# Patient Record
Sex: FEMALE | Race: WHITE | Employment: FULL TIME | URBAN - METROPOLITAN AREA
[De-identification: names, ages, dates, MRNs, and addresses within clinical notes are randomized per-mention and may not be internally consistent; named-entity substitution may affect disease eponyms.]

---

## 2017-07-22 ENCOUNTER — ANESTHESIA EVENT (OUTPATIENT)
Dept: SURGERY | Age: 37
DRG: 339 | End: 2017-07-22
Payer: COMMERCIAL

## 2017-07-22 ENCOUNTER — ANESTHESIA (OUTPATIENT)
Dept: SURGERY | Age: 37
DRG: 339 | End: 2017-07-22
Payer: COMMERCIAL

## 2017-07-22 ENCOUNTER — HOSPITAL ENCOUNTER (INPATIENT)
Age: 37
LOS: 3 days | Discharge: HOME | DRG: 339 | End: 2017-07-26
Attending: EMERGENCY MEDICINE | Admitting: FAMILY MEDICINE
Payer: COMMERCIAL

## 2017-07-22 ENCOUNTER — APPOINTMENT (OUTPATIENT)
Dept: CT IMAGING | Age: 37
DRG: 339 | End: 2017-07-22
Attending: EMERGENCY MEDICINE
Payer: COMMERCIAL

## 2017-07-22 DIAGNOSIS — K35.32 RUPTURED APPENDICITIS: Primary | ICD-10-CM

## 2017-07-22 PROBLEM — K35.80 ACUTE APPENDICITIS: Status: ACTIVE | Noted: 2017-07-22

## 2017-07-22 LAB
ALBUMIN SERPL BCP-MCNC: 3.6 G/DL (ref 3.5–5)
ALBUMIN/GLOB SERPL: 0.8 {RATIO} (ref 1.1–2.2)
ALP SERPL-CCNC: 124 U/L (ref 45–117)
ALT SERPL-CCNC: 71 U/L (ref 12–78)
ANION GAP BLD CALC-SCNC: 13 MMOL/L (ref 5–15)
AST SERPL W P-5'-P-CCNC: 36 U/L (ref 15–37)
BASOPHILS # BLD AUTO: 0 K/UL
BASOPHILS # BLD: 0 %
BILIRUB SERPL-MCNC: 1.8 MG/DL (ref 0.2–1)
BILIRUB UR QL CFM: NEGATIVE
BUN SERPL-MCNC: 10 MG/DL (ref 6–20)
BUN/CREAT SERPL: 11 (ref 12–20)
CALCIUM SERPL-MCNC: 9.7 MG/DL (ref 8.5–10.1)
CHLORIDE SERPL-SCNC: 97 MMOL/L (ref 97–108)
CO2 SERPL-SCNC: 21 MMOL/L (ref 21–32)
CREAT SERPL-MCNC: 0.94 MG/DL (ref 0.55–1.02)
EOSINOPHIL # BLD: 0 K/UL
EOSINOPHIL NFR BLD: 0 %
ERYTHROCYTE [DISTWIDTH] IN BLOOD BY AUTOMATED COUNT: 13.7 % (ref 11.5–14.5)
GLOBULIN SER CALC-MCNC: 4.8 G/DL (ref 2–4)
GLUCOSE SERPL-MCNC: 328 MG/DL (ref 65–100)
HCG UR QL: NEGATIVE
HCT VFR BLD AUTO: 44.2 % (ref 35–47)
HGB BLD-MCNC: 15.3 G/DL (ref 11.5–16)
LACTATE SERPL-SCNC: 1.5 MMOL/L (ref 0.4–2)
LACTATE SERPL-SCNC: 2.1 MMOL/L (ref 0.4–2)
LIPASE SERPL-CCNC: 86 U/L (ref 73–393)
LYMPHOCYTES # BLD AUTO: 9 %
LYMPHOCYTES # BLD: 0.9 K/UL
MCH RBC QN AUTO: 29.7 PG (ref 26–34)
MCHC RBC AUTO-ENTMCNC: 34.6 G/DL (ref 30–36.5)
MCV RBC AUTO: 85.8 FL (ref 80–99)
METAMYELOCYTES NFR BLD MANUAL: 1 %
MONOCYTES # BLD: 0.2 K/UL
MONOCYTES NFR BLD AUTO: 2 %
NEUTS BAND NFR BLD MANUAL: 21 %
NEUTS SEG # BLD: 9.2 K/UL
NEUTS SEG NFR BLD AUTO: 67 %
PLATELET # BLD AUTO: 217 K/UL (ref 150–400)
POTASSIUM SERPL-SCNC: 3.6 MMOL/L (ref 3.5–5.1)
PROT SERPL-MCNC: 8.4 G/DL (ref 6.4–8.2)
RBC # BLD AUTO: 5.15 M/UL (ref 3.8–5.2)
RBC MORPH BLD: NORMAL
SODIUM SERPL-SCNC: 131 MMOL/L (ref 136–145)
WBC # BLD AUTO: 10.5 K/UL (ref 3.6–11)
WBC MORPH BLD: NORMAL

## 2017-07-22 PROCEDURE — 99218 HC RM OBSERVATION: CPT

## 2017-07-22 PROCEDURE — 80053 COMPREHEN METABOLIC PANEL: CPT | Performed by: EMERGENCY MEDICINE

## 2017-07-22 PROCEDURE — 76210000006 HC OR PH I REC 0.5 TO 1 HR: Performed by: FAMILY MEDICINE

## 2017-07-22 PROCEDURE — 77030008771 HC TU NG SALEM SUMP -A: Performed by: NURSE ANESTHETIST, CERTIFIED REGISTERED

## 2017-07-22 PROCEDURE — 77030008467 HC STPLR SKN COVD -B: Performed by: FAMILY MEDICINE

## 2017-07-22 PROCEDURE — 77030031139 HC SUT VCRL2 J&J -A: Performed by: FAMILY MEDICINE

## 2017-07-22 PROCEDURE — 74011250636 HC RX REV CODE- 250/636

## 2017-07-22 PROCEDURE — 93005 ELECTROCARDIOGRAM TRACING: CPT

## 2017-07-22 PROCEDURE — 77030010507 HC ADH SKN DERMBND J&J -B: Performed by: FAMILY MEDICINE

## 2017-07-22 PROCEDURE — 74011000258 HC RX REV CODE- 258: Performed by: EMERGENCY MEDICINE

## 2017-07-22 PROCEDURE — 81025 URINE PREGNANCY TEST: CPT

## 2017-07-22 PROCEDURE — 83690 ASSAY OF LIPASE: CPT | Performed by: EMERGENCY MEDICINE

## 2017-07-22 PROCEDURE — 77030012022 HC APPL CLP ENDOSC COVD -C: Performed by: FAMILY MEDICINE

## 2017-07-22 PROCEDURE — 74011000250 HC RX REV CODE- 250: Performed by: FAMILY MEDICINE

## 2017-07-22 PROCEDURE — 77030020263 HC SOL INJ SOD CL0.9% LFCR 1000ML: Performed by: FAMILY MEDICINE

## 2017-07-22 PROCEDURE — 77030026438 HC STYL ET INTUB CARD -A: Performed by: NURSE ANESTHETIST, CERTIFIED REGISTERED

## 2017-07-22 PROCEDURE — 77030002933 HC SUT MCRYL J&J -A: Performed by: FAMILY MEDICINE

## 2017-07-22 PROCEDURE — 96375 TX/PRO/DX INJ NEW DRUG ADDON: CPT

## 2017-07-22 PROCEDURE — 77030035051: Performed by: FAMILY MEDICINE

## 2017-07-22 PROCEDURE — 81001 URINALYSIS AUTO W/SCOPE: CPT | Performed by: EMERGENCY MEDICINE

## 2017-07-22 PROCEDURE — 74011000250 HC RX REV CODE- 250

## 2017-07-22 PROCEDURE — 77030008756 HC TU IRR SUC STRY -B: Performed by: FAMILY MEDICINE

## 2017-07-22 PROCEDURE — 87086 URINE CULTURE/COLONY COUNT: CPT | Performed by: EMERGENCY MEDICINE

## 2017-07-22 PROCEDURE — 76010000153 HC OR TIME 1.5 TO 2 HR: Performed by: FAMILY MEDICINE

## 2017-07-22 PROCEDURE — 96365 THER/PROPH/DIAG IV INF INIT: CPT

## 2017-07-22 PROCEDURE — 77030020053 HC ELECTRD LAPSCP COVD -B: Performed by: FAMILY MEDICINE

## 2017-07-22 PROCEDURE — 74177 CT ABD & PELVIS W/CONTRAST: CPT

## 2017-07-22 PROCEDURE — 88304 TISSUE EXAM BY PATHOLOGIST: CPT | Performed by: FAMILY MEDICINE

## 2017-07-22 PROCEDURE — 77030009852 HC PCH RTVR ENDOSC COVD -B: Performed by: FAMILY MEDICINE

## 2017-07-22 PROCEDURE — 77030022473 HC HNDL ENDO GIA UNIV USDA -C: Performed by: FAMILY MEDICINE

## 2017-07-22 PROCEDURE — 77030011296 HC FCPS GRSP ENDOSC J&J -B: Performed by: FAMILY MEDICINE

## 2017-07-22 PROCEDURE — 74011250636 HC RX REV CODE- 250/636: Performed by: FAMILY MEDICINE

## 2017-07-22 PROCEDURE — 77030018390 HC SPNG HEMSTAT2 J&J -B: Performed by: FAMILY MEDICINE

## 2017-07-22 PROCEDURE — 87040 BLOOD CULTURE FOR BACTERIA: CPT | Performed by: EMERGENCY MEDICINE

## 2017-07-22 PROCEDURE — 76060000034 HC ANESTHESIA 1.5 TO 2 HR: Performed by: FAMILY MEDICINE

## 2017-07-22 PROCEDURE — 99285 EMERGENCY DEPT VISIT HI MDM: CPT

## 2017-07-22 PROCEDURE — 96361 HYDRATE IV INFUSION ADD-ON: CPT

## 2017-07-22 PROCEDURE — 77030019908 HC STETH ESOPH SIMS -A: Performed by: NURSE ANESTHETIST, CERTIFIED REGISTERED

## 2017-07-22 PROCEDURE — 77030008684 HC TU ET CUF COVD -B: Performed by: NURSE ANESTHETIST, CERTIFIED REGISTERED

## 2017-07-22 PROCEDURE — 77030018836 HC SOL IRR NACL ICUM -A: Performed by: FAMILY MEDICINE

## 2017-07-22 PROCEDURE — 0DTJ4ZZ RESECTION OF APPENDIX, PERCUTANEOUS ENDOSCOPIC APPROACH: ICD-10-PCS | Performed by: FAMILY MEDICINE

## 2017-07-22 PROCEDURE — 83605 ASSAY OF LACTIC ACID: CPT | Performed by: EMERGENCY MEDICINE

## 2017-07-22 PROCEDURE — 77030035048 HC TRCR ENDOSC OPTCL COVD -B: Performed by: FAMILY MEDICINE

## 2017-07-22 PROCEDURE — 36415 COLL VENOUS BLD VENIPUNCTURE: CPT | Performed by: EMERGENCY MEDICINE

## 2017-07-22 PROCEDURE — 74011636320 HC RX REV CODE- 636/320: Performed by: EMERGENCY MEDICINE

## 2017-07-22 PROCEDURE — 77030011640 HC PAD GRND REM COVD -A: Performed by: FAMILY MEDICINE

## 2017-07-22 PROCEDURE — 77030022474 HC RELD STPLR ENDO GIA COVD -C: Performed by: FAMILY MEDICINE

## 2017-07-22 PROCEDURE — 85025 COMPLETE CBC W/AUTO DIFF WBC: CPT | Performed by: EMERGENCY MEDICINE

## 2017-07-22 PROCEDURE — 74011250636 HC RX REV CODE- 250/636: Performed by: EMERGENCY MEDICINE

## 2017-07-22 PROCEDURE — 77030035220 HC TRCR ENDOSC BLNTPRT ANCHR COVD -B: Performed by: FAMILY MEDICINE

## 2017-07-22 RX ORDER — LIDOCAINE HYDROCHLORIDE 10 MG/ML
0.1 INJECTION, SOLUTION EPIDURAL; INFILTRATION; INTRACAUDAL; PERINEURAL AS NEEDED
Status: DISCONTINUED | OUTPATIENT
Start: 2017-07-22 | End: 2017-07-22 | Stop reason: HOSPADM

## 2017-07-22 RX ORDER — SODIUM CHLORIDE 9 MG/ML
50 INJECTION, SOLUTION INTRAVENOUS
Status: COMPLETED | OUTPATIENT
Start: 2017-07-22 | End: 2017-07-22

## 2017-07-22 RX ORDER — MIDAZOLAM HYDROCHLORIDE 1 MG/ML
INJECTION, SOLUTION INTRAMUSCULAR; INTRAVENOUS AS NEEDED
Status: DISCONTINUED | OUTPATIENT
Start: 2017-07-22 | End: 2017-07-22 | Stop reason: HOSPADM

## 2017-07-22 RX ORDER — ONDANSETRON 2 MG/ML
INJECTION INTRAMUSCULAR; INTRAVENOUS AS NEEDED
Status: DISCONTINUED | OUTPATIENT
Start: 2017-07-22 | End: 2017-07-22 | Stop reason: HOSPADM

## 2017-07-22 RX ORDER — SODIUM CHLORIDE 0.9 % (FLUSH) 0.9 %
10 SYRINGE (ML) INJECTION
Status: COMPLETED | OUTPATIENT
Start: 2017-07-22 | End: 2017-07-22

## 2017-07-22 RX ORDER — MORPHINE SULFATE 4 MG/ML
4 INJECTION, SOLUTION INTRAMUSCULAR; INTRAVENOUS
Status: COMPLETED | OUTPATIENT
Start: 2017-07-22 | End: 2017-07-22

## 2017-07-22 RX ORDER — MIDAZOLAM HYDROCHLORIDE 1 MG/ML
1 INJECTION, SOLUTION INTRAMUSCULAR; INTRAVENOUS AS NEEDED
Status: DISCONTINUED | OUTPATIENT
Start: 2017-07-22 | End: 2017-07-22 | Stop reason: HOSPADM

## 2017-07-22 RX ORDER — SODIUM CHLORIDE, SODIUM LACTATE, POTASSIUM CHLORIDE, CALCIUM CHLORIDE 600; 310; 30; 20 MG/100ML; MG/100ML; MG/100ML; MG/100ML
100 INJECTION, SOLUTION INTRAVENOUS CONTINUOUS
Status: DISCONTINUED | OUTPATIENT
Start: 2017-07-22 | End: 2017-07-22 | Stop reason: HOSPADM

## 2017-07-22 RX ORDER — FENTANYL CITRATE 50 UG/ML
25 INJECTION, SOLUTION INTRAMUSCULAR; INTRAVENOUS
Status: DISCONTINUED | OUTPATIENT
Start: 2017-07-22 | End: 2017-07-22 | Stop reason: HOSPADM

## 2017-07-22 RX ORDER — FENTANYL CITRATE 50 UG/ML
INJECTION, SOLUTION INTRAMUSCULAR; INTRAVENOUS AS NEEDED
Status: DISCONTINUED | OUTPATIENT
Start: 2017-07-22 | End: 2017-07-22 | Stop reason: HOSPADM

## 2017-07-22 RX ORDER — MIDAZOLAM HYDROCHLORIDE 1 MG/ML
0.5 INJECTION, SOLUTION INTRAMUSCULAR; INTRAVENOUS
Status: DISCONTINUED | OUTPATIENT
Start: 2017-07-22 | End: 2017-07-22 | Stop reason: HOSPADM

## 2017-07-22 RX ORDER — PROPOFOL 10 MG/ML
INJECTION, EMULSION INTRAVENOUS AS NEEDED
Status: DISCONTINUED | OUTPATIENT
Start: 2017-07-22 | End: 2017-07-22 | Stop reason: HOSPADM

## 2017-07-22 RX ORDER — HYDROCODONE BITARTRATE AND ACETAMINOPHEN 5; 325 MG/1; MG/1
1 TABLET ORAL AS NEEDED
Status: DISCONTINUED | OUTPATIENT
Start: 2017-07-22 | End: 2017-07-22 | Stop reason: HOSPADM

## 2017-07-22 RX ORDER — HYDROCODONE BITARTRATE AND ACETAMINOPHEN 5; 325 MG/1; MG/1
1-2 TABLET ORAL
Status: DISCONTINUED | OUTPATIENT
Start: 2017-07-22 | End: 2017-07-26 | Stop reason: HOSPADM

## 2017-07-22 RX ORDER — SODIUM CHLORIDE, SODIUM LACTATE, POTASSIUM CHLORIDE, CALCIUM CHLORIDE 600; 310; 30; 20 MG/100ML; MG/100ML; MG/100ML; MG/100ML
125 INJECTION, SOLUTION INTRAVENOUS CONTINUOUS
Status: DISCONTINUED | OUTPATIENT
Start: 2017-07-22 | End: 2017-07-23

## 2017-07-22 RX ORDER — ONDANSETRON 2 MG/ML
4 INJECTION INTRAMUSCULAR; INTRAVENOUS
Status: COMPLETED | OUTPATIENT
Start: 2017-07-22 | End: 2017-07-22

## 2017-07-22 RX ORDER — HYDROMORPHONE HYDROCHLORIDE 1 MG/ML
1 INJECTION, SOLUTION INTRAMUSCULAR; INTRAVENOUS; SUBCUTANEOUS
Status: COMPLETED | OUTPATIENT
Start: 2017-07-22 | End: 2017-07-22

## 2017-07-22 RX ORDER — ONDANSETRON 2 MG/ML
4 INJECTION INTRAMUSCULAR; INTRAVENOUS AS NEEDED
Status: DISCONTINUED | OUTPATIENT
Start: 2017-07-22 | End: 2017-07-22 | Stop reason: HOSPADM

## 2017-07-22 RX ORDER — HYDROMORPHONE HYDROCHLORIDE 2 MG/ML
INJECTION, SOLUTION INTRAMUSCULAR; INTRAVENOUS; SUBCUTANEOUS AS NEEDED
Status: DISCONTINUED | OUTPATIENT
Start: 2017-07-22 | End: 2017-07-22 | Stop reason: HOSPADM

## 2017-07-22 RX ORDER — SODIUM CHLORIDE 0.9 % (FLUSH) 0.9 %
5-10 SYRINGE (ML) INJECTION AS NEEDED
Status: DISCONTINUED | OUTPATIENT
Start: 2017-07-22 | End: 2017-07-26 | Stop reason: HOSPADM

## 2017-07-22 RX ORDER — HYDROMORPHONE HYDROCHLORIDE 1 MG/ML
0.5 INJECTION, SOLUTION INTRAMUSCULAR; INTRAVENOUS; SUBCUTANEOUS
Status: DISCONTINUED | OUTPATIENT
Start: 2017-07-22 | End: 2017-07-22 | Stop reason: HOSPADM

## 2017-07-22 RX ORDER — DEXAMETHASONE SODIUM PHOSPHATE 4 MG/ML
INJECTION, SOLUTION INTRA-ARTICULAR; INTRALESIONAL; INTRAMUSCULAR; INTRAVENOUS; SOFT TISSUE AS NEEDED
Status: DISCONTINUED | OUTPATIENT
Start: 2017-07-22 | End: 2017-07-22 | Stop reason: HOSPADM

## 2017-07-22 RX ORDER — BUPIVACAINE HYDROCHLORIDE AND EPINEPHRINE 5; 5 MG/ML; UG/ML
INJECTION, SOLUTION EPIDURAL; INTRACAUDAL; PERINEURAL AS NEEDED
Status: DISCONTINUED | OUTPATIENT
Start: 2017-07-22 | End: 2017-07-22 | Stop reason: HOSPADM

## 2017-07-22 RX ORDER — ROCURONIUM BROMIDE 10 MG/ML
INJECTION, SOLUTION INTRAVENOUS AS NEEDED
Status: DISCONTINUED | OUTPATIENT
Start: 2017-07-22 | End: 2017-07-22 | Stop reason: HOSPADM

## 2017-07-22 RX ORDER — SUCCINYLCHOLINE CHLORIDE 20 MG/ML
INJECTION INTRAMUSCULAR; INTRAVENOUS AS NEEDED
Status: DISCONTINUED | OUTPATIENT
Start: 2017-07-22 | End: 2017-07-22 | Stop reason: HOSPADM

## 2017-07-22 RX ORDER — DIPHENHYDRAMINE HYDROCHLORIDE 50 MG/ML
12.5 INJECTION, SOLUTION INTRAMUSCULAR; INTRAVENOUS AS NEEDED
Status: DISCONTINUED | OUTPATIENT
Start: 2017-07-22 | End: 2017-07-22 | Stop reason: HOSPADM

## 2017-07-22 RX ORDER — NEOSTIGMINE METHYLSULFATE 1 MG/ML
INJECTION INTRAVENOUS AS NEEDED
Status: DISCONTINUED | OUTPATIENT
Start: 2017-07-22 | End: 2017-07-22 | Stop reason: HOSPADM

## 2017-07-22 RX ORDER — SODIUM CHLORIDE 0.9 % (FLUSH) 0.9 %
5-10 SYRINGE (ML) INJECTION EVERY 8 HOURS
Status: DISCONTINUED | OUTPATIENT
Start: 2017-07-23 | End: 2017-07-26 | Stop reason: HOSPADM

## 2017-07-22 RX ORDER — LIDOCAINE HYDROCHLORIDE 20 MG/ML
INJECTION, SOLUTION EPIDURAL; INFILTRATION; INTRACAUDAL; PERINEURAL AS NEEDED
Status: DISCONTINUED | OUTPATIENT
Start: 2017-07-22 | End: 2017-07-22 | Stop reason: HOSPADM

## 2017-07-22 RX ORDER — FENTANYL CITRATE 50 UG/ML
50 INJECTION, SOLUTION INTRAMUSCULAR; INTRAVENOUS AS NEEDED
Status: DISCONTINUED | OUTPATIENT
Start: 2017-07-22 | End: 2017-07-22 | Stop reason: HOSPADM

## 2017-07-22 RX ORDER — GLYCOPYRROLATE 0.2 MG/ML
INJECTION INTRAMUSCULAR; INTRAVENOUS AS NEEDED
Status: DISCONTINUED | OUTPATIENT
Start: 2017-07-22 | End: 2017-07-22 | Stop reason: HOSPADM

## 2017-07-22 RX ORDER — HYDROMORPHONE HYDROCHLORIDE 1 MG/ML
1 INJECTION, SOLUTION INTRAMUSCULAR; INTRAVENOUS; SUBCUTANEOUS
Status: DISCONTINUED | OUTPATIENT
Start: 2017-07-22 | End: 2017-07-26 | Stop reason: HOSPADM

## 2017-07-22 RX ORDER — ENOXAPARIN SODIUM 100 MG/ML
40 INJECTION SUBCUTANEOUS EVERY 12 HOURS
Status: DISCONTINUED | OUTPATIENT
Start: 2017-07-23 | End: 2017-07-26 | Stop reason: HOSPADM

## 2017-07-22 RX ADMIN — PIPERACILLIN SODIUM,TAZOBACTAM SODIUM 3.38 G: 3; .375 INJECTION, POWDER, FOR SOLUTION INTRAVENOUS at 17:47

## 2017-07-22 RX ADMIN — FENTANYL CITRATE 50 MCG: 50 INJECTION, SOLUTION INTRAMUSCULAR; INTRAVENOUS at 20:38

## 2017-07-22 RX ADMIN — FENTANYL CITRATE 100 MCG: 50 INJECTION, SOLUTION INTRAMUSCULAR; INTRAVENOUS at 22:05

## 2017-07-22 RX ADMIN — ROCURONIUM BROMIDE 45 MG: 10 INJECTION, SOLUTION INTRAVENOUS at 20:45

## 2017-07-22 RX ADMIN — MORPHINE SULFATE 4 MG: 4 INJECTION, SOLUTION INTRAMUSCULAR; INTRAVENOUS at 16:58

## 2017-07-22 RX ADMIN — SODIUM CHLORIDE, SODIUM LACTATE, POTASSIUM CHLORIDE, AND CALCIUM CHLORIDE: 600; 310; 30; 20 INJECTION, SOLUTION INTRAVENOUS at 20:31

## 2017-07-22 RX ADMIN — SODIUM CHLORIDE 1000 ML: 900 INJECTION, SOLUTION INTRAVENOUS at 14:50

## 2017-07-22 RX ADMIN — SODIUM CHLORIDE 1000 ML: 900 INJECTION, SOLUTION INTRAVENOUS at 16:17

## 2017-07-22 RX ADMIN — HYDROMORPHONE HYDROCHLORIDE 0.2 MG: 2 INJECTION, SOLUTION INTRAMUSCULAR; INTRAVENOUS; SUBCUTANEOUS at 21:08

## 2017-07-22 RX ADMIN — DEXAMETHASONE SODIUM PHOSPHATE 4 MG: 4 INJECTION, SOLUTION INTRA-ARTICULAR; INTRALESIONAL; INTRAMUSCULAR; INTRAVENOUS; SOFT TISSUE at 21:30

## 2017-07-22 RX ADMIN — HYDROMORPHONE HYDROCHLORIDE 0.4 MG: 2 INJECTION, SOLUTION INTRAMUSCULAR; INTRAVENOUS; SUBCUTANEOUS at 21:04

## 2017-07-22 RX ADMIN — IOPAMIDOL 100 ML: 755 INJECTION, SOLUTION INTRAVENOUS at 16:40

## 2017-07-22 RX ADMIN — NEOSTIGMINE METHYLSULFATE 5 MG: 1 INJECTION INTRAVENOUS at 22:08

## 2017-07-22 RX ADMIN — SODIUM CHLORIDE, SODIUM LACTATE, POTASSIUM CHLORIDE, AND CALCIUM CHLORIDE 125 ML/HR: 600; 310; 30; 20 INJECTION, SOLUTION INTRAVENOUS at 22:35

## 2017-07-22 RX ADMIN — SUCCINYLCHOLINE CHLORIDE 120 MG: 20 INJECTION INTRAMUSCULAR; INTRAVENOUS at 20:40

## 2017-07-22 RX ADMIN — ONDANSETRON 4 MG: 2 INJECTION INTRAMUSCULAR; INTRAVENOUS at 21:44

## 2017-07-22 RX ADMIN — HYDROMORPHONE HYDROCHLORIDE 0.2 MG: 2 INJECTION, SOLUTION INTRAMUSCULAR; INTRAVENOUS; SUBCUTANEOUS at 21:00

## 2017-07-22 RX ADMIN — GLYCOPYRROLATE 0.6 MG: 0.2 INJECTION INTRAMUSCULAR; INTRAVENOUS at 22:08

## 2017-07-22 RX ADMIN — Medication 10 ML: at 16:40

## 2017-07-22 RX ADMIN — FENTANYL CITRATE 50 MCG: 50 INJECTION, SOLUTION INTRAMUSCULAR; INTRAVENOUS at 20:32

## 2017-07-22 RX ADMIN — ONDANSETRON 4 MG: 2 INJECTION INTRAMUSCULAR; INTRAVENOUS at 14:51

## 2017-07-22 RX ADMIN — SODIUM CHLORIDE, SODIUM LACTATE, POTASSIUM CHLORIDE, AND CALCIUM CHLORIDE: 600; 310; 30; 20 INJECTION, SOLUTION INTRAVENOUS at 21:30

## 2017-07-22 RX ADMIN — MIDAZOLAM HYDROCHLORIDE 2 MG: 1 INJECTION, SOLUTION INTRAMUSCULAR; INTRAVENOUS at 20:32

## 2017-07-22 RX ADMIN — ROCURONIUM BROMIDE 5 MG: 10 INJECTION, SOLUTION INTRAVENOUS at 20:40

## 2017-07-22 RX ADMIN — HYDROMORPHONE HYDROCHLORIDE 1 MG: 1 INJECTION, SOLUTION INTRAMUSCULAR; INTRAVENOUS; SUBCUTANEOUS at 18:53

## 2017-07-22 RX ADMIN — SODIUM CHLORIDE 50 ML/HR: 900 INJECTION, SOLUTION INTRAVENOUS at 16:40

## 2017-07-22 RX ADMIN — HYDROMORPHONE HYDROCHLORIDE 0.2 MG: 2 INJECTION, SOLUTION INTRAMUSCULAR; INTRAVENOUS; SUBCUTANEOUS at 20:50

## 2017-07-22 RX ADMIN — SODIUM CHLORIDE, SODIUM LACTATE, POTASSIUM CHLORIDE, AND CALCIUM CHLORIDE: 600; 310; 30; 20 INJECTION, SOLUTION INTRAVENOUS at 21:12

## 2017-07-22 RX ADMIN — LIDOCAINE HYDROCHLORIDE 60 MG: 20 INJECTION, SOLUTION EPIDURAL; INFILTRATION; INTRACAUDAL; PERINEURAL at 20:40

## 2017-07-22 RX ADMIN — PROPOFOL 180 MG: 10 INJECTION, EMULSION INTRAVENOUS at 20:40

## 2017-07-22 RX ADMIN — ROCURONIUM BROMIDE 10 MG: 10 INJECTION, SOLUTION INTRAVENOUS at 20:58

## 2017-07-22 NOTE — ED NOTES
Dr. Amadou Rosenberg at bedside to discuss results of CT to patient. Per MD, patient not code purple. A order was received to obtain paired blood cultures and administer IV antibiotics.

## 2017-07-22 NOTE — H&P
Surgery History and Physical    Subjective:      Criss Guerra is a 40 y.o. female who presents for evaluation of severe diffuse abdominal pain. The pain is described as constant and getting worse and is 10/10 in intensity. Onset was 4 days ago. Symptoms have been gradually worsening since. Aggravating factors: eating and moving. . Associated symptoms: vomiting, chills, anorexia. Thought she was constipated and was trying to treat at home. CT scan shows:\"APPENDIX: There is significant stranding in the right lower quadrant and there  is thickening of the appendiceal wall. There is distention of the is appendiceal  tip to 17 mm. There are bubbles of gas within the abdomen. History reviewed. No pertinent past medical history. History reviewed. No pertinent surgical history. History reviewed. No pertinent family history.   Social History   Substance Use Topics    Smoking status: Former Smoker     Quit date: 7/22/2015    Smokeless tobacco: Never Used    Alcohol use Not on file      Prior to Admission medications    Not on File      Allergies   Allergen Reactions    Sulfa (Sulfonamide Antibiotics) Swelling       Review of Systems see HPI/PMH/ER record    Objective:     Patient Vitals for the past 8 hrs:   BP Temp Pulse Resp SpO2 Height Weight   07/22/17 1900 112/79 - (!) 128 19 91 % - -   07/22/17 1845 109/90 - (!) 128 24 93 % - -   07/22/17 1830 129/81 - (!) 132 22 94 % - -   07/22/17 1815 124/78 - (!) 129 26 94 % - -   07/22/17 1800 116/79 - (!) 132 25 93 % - -   07/22/17 1745 114/77 - (!) 130 27 92 % - -   07/22/17 1738 119/75 - (!) 133 17 94 % - -   07/22/17 1715 136/87 100 °F (37.8 °C) (!) 136 22 95 % - -   07/22/17 1700 138/90 - (!) 132 18 94 % - -   07/22/17 1645 (!) 148/99 - (!) 132 20 92 % - -   07/22/17 1639 - - (!) 132 19 92 % - -   07/22/17 1638 (!) 149/95 - - - - - -   07/22/17 1615 127/86 - (!) 137 (!) 38 91 % - -   07/22/17 1545 125/85 - (!) 138 20 93 % - -   07/22/17 1530 124/81 - Jayla Garcia 142 20 93 % - -   17 1515 126/84 - (!) 137 24 94 % - -   17 1500 125/83 - (!) 139 16 94 % - -   17 1445 126/79 - (!) 145 24 93 % - -   17 1438 - - (!) 155 18 94 % - -   17 1436 126/87 - - - - - -   17 1419 132/83 98.6 °F (37 °C) (!) 158 18 96 % 5' (1.524 m) 227 lb 11.8 oz (103.3 kg)       Temp (24hrs), Av.3 °F (37.4 °C), Min:98.6 °F (37 °C), Max:100 °F (37.8 °C)      Physical Exam Constitutional: She is oriented to person, place, and time. Obese female Appears ill  Head: Normocephalic and atraumatic. Mouth/Throat: Oropharynx is clear and moist.   Eyes: Conjunctivae and EOM are normal.   Neck:No tracheal deviation present. Cardiovascular: Regular rhythm Tachycardia present. Pulmonary/Chest: Effort normal   Abdominal: Tender throughout with guarding and rebound, greater in lower abdomen   Musculoskeletal: grossly wnl  Neurological: She is alert and oriented to person, place, and time. No focal deficits Appreciated   Skin: Skin is warm and intact. Psychiatric: She has a normal mood and affect. Her behavior is normal. Judgment and thought content normal.     Assessment:     Acute appendicitis with perforation and generalized peritonitis    Plan: To OR emergently for laparoscopic appendectomy    Discussed the risk of surgery including but not limited to bleeding, infection, abscess formation, drainage of abscesses postop, bowel injury, open procedure, future procedures, hernias,  and the risks of general anesthetic. The patient understands the risks; any and all questions were answered to the patient's satisfaction.     Signed By: Vidal Lima MD   [facility]    2017

## 2017-07-22 NOTE — ED NOTES
Patient reports some severe abdominal pain that began Wednesday morning.  She took stool softeners for constipation and last night, she did an emema but bowel movements have been smaller than normal.

## 2017-07-22 NOTE — ED NOTES
Patient assisted back in bed, on monitor x3, call bell within reach. Patient urine sample sent to lab at this time. Patient POC pregnancy is negative.

## 2017-07-22 NOTE — IP AVS SNAPSHOT
[address] [facility] 
[phone] Patient: Cris Ramon MRN: [id number] BPE:[id number] Current Discharge Medication List  
  
START taking these medications Dose & Instructions Dispensing Information Comments Morning Noon Evening Bedtime  
 amoxicillin-clavulanate 875-125 mg per tablet Commonly known as:  AUGMENTIN Your last dose was: Your next dose is:    
   
   
 Dose:  1 Tab Take 1 Tab by mouth two (2) times a day for 7 days. Quantity:  14 Tab Refills:  0 Blood-Glucose Meter monitoring kit Your last dose was: Your next dose is:    
   
   
 Check finger stick glucose 4 times a day as instructed & keep a log of it for PCP Quantity:  1 Kit Refills:  1 HYDROcodone-acetaminophen 5-325 mg per tablet Commonly known as:  Janas Mentone Your last dose was: Your next dose is:    
   
   
 Dose:  1-2 Tab Take 1-2 Tabs by mouth every four (4) hours as needed for Pain. Max Daily Amount: 12 Tabs. Quantity:  30 Tab Refills:  0  
     
   
   
   
  
 insulin glargine 100 unit/mL (3 mL) Inpn Commonly known as:  LANTUS SOLOSTAR Your last dose was: Your next dose is:    
   
   
 Dose:  25 Units 25 Units by SubCUTAneous route nightly. Indications: type 2 diabetes mellitus Quantity:  1 Pen Refills:  1 Where to Get Your Medications Information on where to get these meds will be given to you by the nurse or doctor. ! Ask your nurse or doctor about these medications  
  amoxicillin-clavulanate 875-125 mg per tablet Blood-Glucose Meter monitoring kit HYDROcodone-acetaminophen 5-325 mg per tablet  
 insulin glargine 100 unit/mL (3 mL) Inpn

## 2017-07-22 NOTE — ANESTHESIA PREPROCEDURE EVALUATION
Anesthetic History   No history of anesthetic complications            Review of Systems / Medical History  Patient summary reviewed, nursing notes reviewed and pertinent labs reviewed    Pulmonary  Within defined limits                 Neuro/Psych   Within defined limits           Cardiovascular  Within defined limits                Exercise tolerance: >4 METS     GI/Hepatic/Renal  Within defined limits              Endo/Other  Within defined limits    Hypothyroidism  Obesity     Other Findings   Comments: Acute Appendicitis           Physical Exam    Airway  Mallampati: II  TM Distance: 4 - 6 cm  Neck ROM: normal range of motion   Mouth opening: Normal     Cardiovascular  Regular rate and rhythm,  S1 and S2 normal,  no murmur, click, rub, or gallop             Dental  No notable dental hx       Pulmonary  Breath sounds clear to auscultation               Abdominal  GI exam deferred       Other Findings            Anesthetic Plan    ASA: 2, emergent  Anesthesia type: general    Monitoring Plan: BIS      Induction: Intravenous  Anesthetic plan and risks discussed with: Patient

## 2017-07-22 NOTE — ED NOTES
Bedside and Verbal shift change report given to Maria T Weber RN (oncoming nurse) by Renato Carrero RN (offgoing nurse). Report included the following information SBAR, Kardex and ED Summary.

## 2017-07-22 NOTE — ED NOTES
Patient returned from CT at this time, call bell within reach. MD notified of patient vitals, per MD patient is not code purple. MD notified of patient's pain and not receiving any pain medication. MD will place order at this time.

## 2017-07-22 NOTE — IP AVS SNAPSHOT
[address] [facility] 
[phone] Patient: Karine Mcmahan MRN: [id number] FBU:[birth year] You are allergic to the following Allergen Reactions Sulfa (Sulfonamide Antibiotics) Swelling Recent Documentation Height Weight Breastfeeding? BMI Smoking Status 1.524 m 103.3 kg No 44.48 kg/m2 Former Smoker Unresulted Labs Order Current Status CULTURE, BLOOD, PAIRED Preliminary result Emergency Contacts Name Discharge Info Relation Home Work Mobile Sam Hylton  Spouse [3] [phone] About your hospitalization You were admitted on:  July 22, 2017 You last received care in the:  [facility] 2 GENERAL SURGERY You were discharged on:  July 26, 2017 Unit phone number:  [phone] Why you were hospitalized Your primary diagnosis was:  Not on File Your diagnoses also included:  Acute Appendicitis, Perforated Appendix Providers Seen During Your Hospitalizations Provider Role Specialty Primary office phone Leonardo Sadler DO Attending Provider Emergency Medicine [phone] Susanne Pro MD Attending Provider General Surgery [phone] Your Primary Care Physician (PCP) Primary Care Physician Office Phone Office Fax William Jamel [phone] Follow-up Information Follow up With Details Comments Contact Info Garrison Jimenez MD   [address] [place] [address] 
[phone] Current Discharge Medication List  
  
START taking these medications Dose & Instructions Dispensing Information Comments Morning Noon Evening Bedtime  
 amoxicillin-clavulanate 875-125 mg per tablet Commonly known as:  AUGMENTIN Your last dose was: Your next dose is:    
   
   
 Dose:  1 Tab Take 1 Tab by mouth two (2) times a day for 7 days. Quantity:  14 Tab Refills:  0 Blood-Glucose Meter monitoring kit Your last dose was: Your next dose is:    
   
   
 Check finger stick glucose 4 times a day as instructed & keep a log of it for PCP Quantity:  1 Kit Refills:  1 HYDROcodone-acetaminophen 5-325 mg per tablet Commonly known as:  Sowmya Richard Your last dose was: Your next dose is:    
   
   
 Dose:  1-2 Tab Take 1-2 Tabs by mouth every four (4) hours as needed for Pain. Max Daily Amount: 12 Tabs. Quantity:  30 Tab Refills:  0  
     
   
   
   
  
 insulin glargine 100 unit/mL (3 mL) Inpn Commonly known as:  LANTUS SOLOSTAR Your last dose was: Your next dose is:    
   
   
 Dose:  25 Units 25 Units by SubCUTAneous route nightly. Indications: type 2 diabetes mellitus Quantity:  1 Pen Refills:  1 Where to Get Your Medications Information on where to get these meds will be given to you by the nurse or doctor. ! Ask your nurse or doctor about these medications  
  amoxicillin-clavulanate 875-125 mg per tablet Blood-Glucose Meter monitoring kit HYDROcodone-acetaminophen 5-325 mg per tablet  
 insulin glargine 100 unit/mL (3 mL) Inpn Discharge Instructions None Discharge Orders None Introducing [facility] SERVICES! Melvi Villaseñor introduces [employer] patient portal. Now you can access parts of your medical record, email your doctor's office, and request medication refills online. 1. In your internet browser, go to [email or web address] 2. Click on the First Time User? Click Here link in the Sign In box. You will see the New Member Sign Up page. 3. Enter your [employer] Access Code exactly as it appears below. You will not need to use this code after youve completed the sign-up process.  If you do not sign up before the expiration date, you must request a new code. 
 
· [employer] Access Code: [email or web address] Expires: 10/20/2017  2:30 PM 
 
4. Enter the last four digits of your Social Security Number (xxxx) and Date of Birth (mm/dd/yyyy) as indicated and click Submit. You will be taken to the next sign-up page. 5. Create a [employer] ID. This will be your [employer] login ID and cannot be changed, so think of one that is secure and easy to remember. 6. Create a [employer] password. You can change your password at any time. 7. Enter your Password Reset Question and Answer. This can be used at a later time if you forget your password. 8. Enter your e-mail address. You will receive e-mail notification when new information is available in [address] 9. Click Sign Up. You can now view and download portions of your medical record. 10. Click the Download Summary menu link to download a portable copy of your medical information. If you have questions, please visit the Frequently Asked Questions section of the [employer] website. Remember, [employer] is NOT to be used for urgent needs. For medical emergencies, dial 911. Now available from your iPhone and Android! General Information Please provide this summary of care documentation to your next provider. Patient Signature:  ____________________________________________________________ Date:  ____________________________________________________________  
  
Adela Powers Provider Signature:  ____________________________________________________________ Date:  ____________________________________________________________

## 2017-07-22 NOTE — ED PROVIDER NOTES
HPI Comments: Mary Lucas is a 40 y.o. female with no known PMHx who presents ambulatory to [facility] ED with cc of acute onset constant mid to lower abdominal pain x 3 days with associated nausea and vomiting that is worse after eating. Pt states her pain extends from the \"bottom of her ribs to her pelvis\". Pt's pain is worse with bending or pressure and denies any alleviating factors. Pt believed her pain was associated to constipation and tried stool softeners 2 nights ago with no relief. She also reports trying an enema and suppository last night with no relief. Pt states that she cannot tolerate PO. She has not eaten much x 3 days and today only reports drinking some water and green tea. Pt's most recent episode of emesis was at noon today which had no food particles in it, she states it was watery emesis with a green color likely from the green tea she drank. Pt specifically denies any hx of abdominal surgeries, use of contraceptives, CP, SOB. PCP: Annette Joy MD    Social Hx: - tobacco (former smoker), -EtOH (-), - illicit drugs (-). There are no other complaints, changes or physical findings at this time. The history is provided by the patient. No  was used. History reviewed. No pertinent past medical history. History reviewed. No pertinent surgical history. History reviewed. No pertinent family history. Social History     Social History    Marital status: SINGLE     Spouse name: N/A    Number of children: N/A    Years of education: N/A     Occupational History    Not on file.      Social History Main Topics    Smoking status: Former Smoker     Quit date: 7/22/2015    Smokeless tobacco: Never Used    Alcohol use Not on file    Drug use: No    Sexual activity: Not on file     Other Topics Concern    Not on file     Social History Narrative    No narrative on file         ALLERGIES: Sulfa (sulfonamide antibiotics)    Review of Systems Constitutional: Negative for fatigue and fever. HENT: Negative. Eyes: Negative. Respiratory: Negative for shortness of breath and wheezing. Cardiovascular: Negative for chest pain and leg swelling. Gastrointestinal: Positive for abdominal pain, constipation, nausea and vomiting. Negative for blood in stool and diarrhea. Endocrine: Negative. Genitourinary: Negative for difficulty urinating and dysuria. Musculoskeletal: Negative. Skin: Negative for rash. Allergic/Immunologic: Negative. Neurological: Negative for weakness and numbness. Hematological: Negative. Psychiatric/Behavioral: Negative. Patient Vitals for the past 12 hrs:   Temp Pulse Resp BP SpO2   07/22/17 1639 - (!) 132 19 - 92 %   07/22/17 1638 - - - (!) 149/95 -   07/22/17 1615 - (!) 137 (!) 38 127/86 91 %   07/22/17 1545 - (!) 138 20 125/85 93 %   07/22/17 1530 - (!) 142 20 124/81 93 %   07/22/17 1515 - (!) 137 24 126/84 94 %   07/22/17 1500 - (!) 139 16 125/83 94 %   07/22/17 1445 - (!) 145 24 126/79 93 %   07/22/17 1438 - (!) 155 18 - 94 %   07/22/17 1436 - - - 126/87 -   07/22/17 1419 98.6 °F (37 °C) (!) 158 18 132/83 96 %          Physical Exam   Constitutional: She is oriented to person, place, and time. She appears well-developed and well-nourished. No distress. HENT:   Head: Normocephalic and atraumatic. Mouth/Throat: Oropharynx is clear and moist.   Eyes: Conjunctivae and EOM are normal.   Neck: Neck supple. No JVD present. No tracheal deviation present. Cardiovascular: Regular rhythm and intact distal pulses. Tachycardia present. Exam reveals no gallop and no friction rub. No murmur heard. Pulmonary/Chest: Effort normal and breath sounds normal. No stridor. No respiratory distress. She has no wheezes. Abdominal: Soft. Bowel sounds are normal. She exhibits no distension and no mass. There is tenderness. There is guarding (mild).    Abdomen is diffusely tender worse in the LLQ with mild guarding. Musculoskeletal: Normal range of motion. She exhibits no edema or tenderness. No deformity   Neurological: She is alert and oriented to person, place, and time. She has normal strength. No focal deficits   Skin: Skin is warm and intact. No rash noted. She is diaphoretic. Psychiatric: She has a normal mood and affect. Her behavior is normal. Judgment and thought content normal.   Nursing note and vitals reviewed. MDM  Number of Diagnoses or Management Options  Ruptured appendicitis:   Diagnosis management comments: Pt presenting with diffuse abdominal pain, worse in the lower abdomen. DDx includes diverticulitis, intestinal perforation, constipation, SBO, uti, ectopic pregnancy, appendicitis, dehydration, electrolyte abnormality. Will check labs, ua, upt, CT abd/pelvis. Will treat with ivf, analgesia, antiemetics, then reassess. Amount and/or Complexity of Data Reviewed  Clinical lab tests: ordered and reviewed  Tests in the radiology section of CPT®: ordered and reviewed  Tests in the medicine section of CPT®: ordered and reviewed  Review and summarize past medical records: yes  Independent visualization of images, tracings, or specimens: yes    Critical Care  Total time providing critical care: 30-74 minutes    ED Course       Procedures    EKG interpretation: (Preliminary)  2:34 PM  Rhythm: sinus tachycardia; and regular . Rate (approx.): 154; Axis: normal; NV interval: normal; QRS interval: normal ; ST/T wave: non-specific ST abnormality. Written by Jorge Luis Welch ED Scribe as dictated by Jaelyn Badillo DO    PROGRESS NOTE:  4:27 PM  Updated the pt on her lab results. Written by Jorge Luis Welch ED Scribe, as dictated by Jaelyn Badillo DO.    CONSULT NOTE:   5:11 PM  Jaelyn Badillo DO spoke with Dr. Sania Waters,   Specialty: General Surgery  Discussed pt's hx, disposition, and available diagnostic and imaging results. Reviewed care plans.  Consultant agrees with plans as outlined. He will admit the pt. Written by AGUS Mendoza, as dictated by Sabi Marquez DO. PROGRESS NOTE:  5:25 PM  Pt updated on all available lab and imaging results. Written by AGUS Mendoza, as dictated by Sabi Marquez DO.    CRITICAL CARE NOTE :    5:27 PM      IMPENDING DETERIORATION -Metabolic    ASSOCIATED RISK FACTORS - Shock, Metabolic changes and Vascular Compromise    MANAGEMENT- Bedside Assessment and Supervision of Care    INTERPRETATION -  CT Scan, ECG and Blood Pressure    INTERVENTIONS - hemodynamic mngmt, vascular control and Metobolic interventions    CASE REVIEW - Medical Sub-Specialist, Nursing and Family    TREATMENT RESPONSE -Improved    PERFORMED BY - Self        NOTES   :      I have spent 60 minutes of critical care time involved in lab review, consultations with specialist, family decision- making, bedside attention and documentation. During this entire length of time I was immediately available to the patient .     Sabi Marquez DO    LABORATORY TESTS:  Recent Results (from the past 12 hour(s))   EKG, 12 LEAD, INITIAL    Collection Time: 07/22/17  2:34 PM   Result Value Ref Range    Ventricular Rate 154 BPM    Atrial Rate 154 BPM    P-R Interval 112 ms    QRS Duration 72 ms    Q-T Interval 320 ms    QTC Calculation (Bezet) 512 ms    Calculated R Axis 66 degrees    Calculated T Axis 67 degrees    Diagnosis       Sinus tachycardia  ST & T wave abnormality, consider anterior ischemia  Abnormal ECG  No previous ECGs available     METABOLIC PANEL, COMPREHENSIVE    Collection Time: 07/22/17  2:48 PM   Result Value Ref Range    Sodium 131 (L) 136 - 145 mmol/L    Potassium 3.6 3.5 - 5.1 mmol/L    Chloride 97 97 - 108 mmol/L    CO2 21 21 - 32 mmol/L    Anion gap 13 5 - 15 mmol/L    Glucose 328 (H) 65 - 100 mg/dL    BUN 10 6 - 20 MG/DL    Creatinine 0.94 0.55 - 1.02 MG/DL    BUN/Creatinine ratio 11 (L) 12 - 20      GFR est AA >60 >60 ml/min/1.73m2    GFR est non-AA >60 >60 ml/min/1.73m2    Calcium 9.7 8.5 - 10.1 MG/DL    Bilirubin, total 1.8 (H) 0.2 - 1.0 MG/DL    ALT (SGPT) 71 12 - 78 U/L    AST (SGOT) 36 15 - 37 U/L    Alk. phosphatase 124 (H) 45 - 117 U/L    Protein, total 8.4 (H) 6.4 - 8.2 g/dL    Albumin 3.6 3.5 - 5.0 g/dL    Globulin 4.8 (H) 2.0 - 4.0 g/dL    A-G Ratio 0.8 (L) 1.1 - 2.2     CBC WITH AUTOMATED DIFF    Collection Time: 07/22/17  2:48 PM   Result Value Ref Range    WBC 10.5 3.6 - 11.0 K/uL    RBC 5.15 3.80 - 5.20 M/uL    HGB 15.3 11.5 - 16.0 g/dL    HCT 44.2 35.0 - 47.0 %    MCV 85.8 80.0 - 99.0 FL    MCH 29.7 26.0 - 34.0 PG    MCHC 34.6 30.0 - 36.5 g/dL    RDW 13.7 11.5 - 14.5 %    PLATELET 102 113 - 122 K/uL    NEUTROPHILS 67 %    BAND NEUTROPHILS 21 %    LYMPHOCYTES 9 %    MONOCYTES 2 %    EOSINOPHILS 0 %    BASOPHILS 0 %    METAMYELOCYTES 1 %    ABS. NEUTROPHILS 9.2 K/UL    ABS. LYMPHOCYTES 0.9 K/UL    ABS. MONOCYTES 0.2 K/UL    ABS. EOSINOPHILS 0.0 K/UL    ABS.  BASOPHILS 0.0 K/UL    RBC COMMENTS NORMOCYTIC, NORMOCHROMIC      WBC COMMENTS TOXIC GRANULATION     LIPASE    Collection Time: 07/22/17  2:48 PM   Result Value Ref Range    Lipase 86 73 - 393 U/L   LACTIC ACID    Collection Time: 07/22/17  2:48 PM   Result Value Ref Range    Lactic acid 2.1 (HH) 0.4 - 2.0 MMOL/L   URINALYSIS W/ REFLEX CULTURE    Collection Time: 07/22/17  3:23 PM   Result Value Ref Range    Color DARK YELLOW      Appearance CLOUDY (A) CLEAR      Specific gravity 1.039 (H) 1.003 - 1.030      pH (UA) 6.0 5.0 - 8.0      Protein 100 (A) NEG mg/dL    Glucose >1000 (A) NEG mg/dL    Ketone 80 (A) NEG mg/dL    Blood NEGATIVE  NEG      Urobilinogen 1.0 0.2 - 1.0 EU/dL    Nitrites NEGATIVE  NEG      Leukocyte Esterase NEGATIVE  NEG      WBC 5-10 0 - 4 /hpf    RBC 0-5 0 - 5 /hpf    Epithelial cells FEW FEW /lpf    Bacteria 2+ (A) NEG /hpf    UA:UC IF INDICATED URINE CULTURE ORDERED (A) CNI     BILIRUBIN, CONFIRM    Collection Time: 07/22/17  3:23 PM   Result Value Ref Range    Bilirubin UA, confirm NEGATIVE  NEG     HCG URINE, QL. - POC    Collection Time: 07/22/17  3:24 PM   Result Value Ref Range    Pregnancy test,urine (POC) NEGATIVE  NEG         IMAGING RESULTS:  CT Results  (Last 48 hours)               07/22/17 1640  CT ABD PELV W CONT Final result    Impression:  IMPRESSION:   Ruptured appendicitis. Narrative:  EXAM:  CT ABD PELV W CONT       INDICATION: Diffuse abdominal pain       COMPARISON: None       CONTRAST:  98 mL of Isovue-370. TECHNIQUE:    Following the uneventful intravenous administration of contrast, thin axial   images were obtained through the abdomen and pelvis. Coronal and sagittal   reconstructions were generated. Oral contrast was not administered. CT dose   reduction was achieved through use of a standardized protocol tailored for this   examination and automatic exposure control for dose modulation. FINDINGS:    LUNG BASES: Clear. INCIDENTALLY IMAGED HEART AND MEDIASTINUM: Unremarkable. LIVER: Hepatic steatosis. GALLBLADDER: Unremarkable. SPLEEN: No mass. PANCREAS: No mass or ductal dilatation. ADRENALS: Unremarkable. KIDNEYS: No mass, calculus, or hydronephrosis. STOMACH: Unremarkable. SMALL BOWEL: No dilatation or wall thickening. COLON: No dilatation or wall thickening. APPENDIX: There is significant stranding in the right lower quadrant and there   is thickening of the appendiceal wall. There is distention of the is appendiceal   tip to 17 mm. There are bubbles of gas within the abdomen. PERITONEUM: No ascites or pneumoperitoneum. RETROPERITONEUM: No lymphadenopathy or aortic aneurysm. REPRODUCTIVE ORGANS: Normal   URINARY BLADDER: No mass or calculus. BONES: No destructive bone lesion.    ADDITIONAL COMMENTS: N/A                 MEDICATIONS GIVEN:  Medications   piperacillin-tazobactam (ZOSYN) 3.375 g in 0.9% sodium chloride (MBP/ADV) 100 mL (not administered)   sodium chloride 0.9 % bolus infusion 1,000 mL (1,000 mL IntraVENous New Bag 7/22/17 1450)   ondansetron (ZOFRAN) injection 4 mg (4 mg IntraVENous Given 7/22/17 1451)   0.9% sodium chloride infusion (50 mL/hr IntraVENous New Bag 7/22/17 1640)   iopamidol (ISOVUE-370) 76 % injection 100 mL (100 mL IntraVENous Given 7/22/17 1640)   sodium chloride (NS) flush 10 mL (10 mL IntraVENous Given 7/22/17 1640)   sodium chloride 0.9 % bolus infusion 1,000 mL (1,000 mL IntraVENous New Bag 7/22/17 1617)   morphine injection 4 mg (4 mg IntraVENous Given 7/22/17 1658)       IMPRESSION:  1. Ruptured appendicitis        PLAN: Admit     ADMIT NOTE:  5:11 PM  Patient is being admitted to the hospital by Dr. Juan Diego Bahena. The results of their tests and reasons for their admission have been discussed with them and/or available family. They convey agreement and understanding for the need to be admitted and for their admission diagnosis. Consultation has been made with the inpatient physician specialist for hospitalization. This note is prepared by Jahaira Tapia acting as Scribe for DO Omar Newby DO: The scribe's documentation has been prepared under my direction and personally reviewed by me in its entirety. I confirm that the note above accurately reflects all work, treatment, procedures, and medical decision making performed by me.

## 2017-07-23 PROBLEM — K35.32 PERFORATED APPENDIX: Status: ACTIVE | Noted: 2017-07-23

## 2017-07-23 LAB
ANION GAP BLD CALC-SCNC: 10 MMOL/L (ref 5–15)
ATRIAL RATE: 154 BPM
BUN SERPL-MCNC: 7 MG/DL (ref 6–20)
BUN/CREAT SERPL: 11 (ref 12–20)
CALCIUM SERPL-MCNC: 8.2 MG/DL (ref 8.5–10.1)
CALCULATED R AXIS, ECG10: 66 DEGREES
CALCULATED T AXIS, ECG11: 67 DEGREES
CHLORIDE SERPL-SCNC: 103 MMOL/L (ref 97–108)
CO2 SERPL-SCNC: 21 MMOL/L (ref 21–32)
CREAT SERPL-MCNC: 0.61 MG/DL (ref 0.55–1.02)
DIAGNOSIS, 93000: NORMAL
ERYTHROCYTE [DISTWIDTH] IN BLOOD BY AUTOMATED COUNT: 14.1 % (ref 11.5–14.5)
GLUCOSE SERPL-MCNC: 241 MG/DL (ref 65–100)
HCT VFR BLD AUTO: 38.8 % (ref 35–47)
HGB BLD-MCNC: 12.5 G/DL (ref 11.5–16)
MCH RBC QN AUTO: 28.3 PG (ref 26–34)
MCHC RBC AUTO-ENTMCNC: 32.2 G/DL (ref 30–36.5)
MCV RBC AUTO: 87.8 FL (ref 80–99)
P-R INTERVAL, ECG05: 112 MS
PLATELET # BLD AUTO: 165 K/UL (ref 150–400)
POTASSIUM SERPL-SCNC: 4.2 MMOL/L (ref 3.5–5.1)
Q-T INTERVAL, ECG07: 320 MS
QRS DURATION, ECG06: 72 MS
QTC CALCULATION (BEZET), ECG08: 512 MS
RBC # BLD AUTO: 4.42 M/UL (ref 3.8–5.2)
SODIUM SERPL-SCNC: 134 MMOL/L (ref 136–145)
VENTRICULAR RATE, ECG03: 154 BPM
WBC # BLD AUTO: 10.3 K/UL (ref 3.6–11)

## 2017-07-23 PROCEDURE — 74011250637 HC RX REV CODE- 250/637: Performed by: FAMILY MEDICINE

## 2017-07-23 PROCEDURE — 74011000258 HC RX REV CODE- 258: Performed by: FAMILY MEDICINE

## 2017-07-23 PROCEDURE — 80048 BASIC METABOLIC PNL TOTAL CA: CPT | Performed by: FAMILY MEDICINE

## 2017-07-23 PROCEDURE — 36415 COLL VENOUS BLD VENIPUNCTURE: CPT | Performed by: FAMILY MEDICINE

## 2017-07-23 PROCEDURE — 65270000029 HC RM PRIVATE

## 2017-07-23 PROCEDURE — 85027 COMPLETE CBC AUTOMATED: CPT | Performed by: FAMILY MEDICINE

## 2017-07-23 PROCEDURE — 74011250636 HC RX REV CODE- 250/636: Performed by: FAMILY MEDICINE

## 2017-07-23 PROCEDURE — 99218 HC RM OBSERVATION: CPT

## 2017-07-23 RX ORDER — SODIUM CHLORIDE 9 MG/ML
75 INJECTION, SOLUTION INTRAVENOUS CONTINUOUS
Status: DISCONTINUED | OUTPATIENT
Start: 2017-07-23 | End: 2017-07-26 | Stop reason: HOSPADM

## 2017-07-23 RX ADMIN — HYDROMORPHONE HYDROCHLORIDE 1 MG: 1 INJECTION, SOLUTION INTRAMUSCULAR; INTRAVENOUS; SUBCUTANEOUS at 02:37

## 2017-07-23 RX ADMIN — SODIUM CHLORIDE 75 ML/HR: 900 INJECTION, SOLUTION INTRAVENOUS at 11:31

## 2017-07-23 RX ADMIN — PIPERACILLIN SODIUM,TAZOBACTAM SODIUM 3.38 G: 3; .375 INJECTION, POWDER, FOR SOLUTION INTRAVENOUS at 10:00

## 2017-07-23 RX ADMIN — HYDROMORPHONE HYDROCHLORIDE 1 MG: 1 INJECTION, SOLUTION INTRAMUSCULAR; INTRAVENOUS; SUBCUTANEOUS at 09:45

## 2017-07-23 RX ADMIN — Medication 5 ML: at 00:00

## 2017-07-23 RX ADMIN — Medication 5 ML: at 22:00

## 2017-07-23 RX ADMIN — HYDROCODONE BITARTRATE AND ACETAMINOPHEN 1 TABLET: 5; 325 TABLET ORAL at 20:25

## 2017-07-23 RX ADMIN — Medication 10 ML: at 13:29

## 2017-07-23 RX ADMIN — SODIUM CHLORIDE, SODIUM LACTATE, POTASSIUM CHLORIDE, AND CALCIUM CHLORIDE 125 ML/HR: 600; 310; 30; 20 INJECTION, SOLUTION INTRAVENOUS at 07:59

## 2017-07-23 RX ADMIN — ENOXAPARIN SODIUM 40 MG: 40 INJECTION SUBCUTANEOUS at 10:00

## 2017-07-23 RX ADMIN — PIPERACILLIN SODIUM,TAZOBACTAM SODIUM 3.38 G: 3; .375 INJECTION, POWDER, FOR SOLUTION INTRAVENOUS at 02:24

## 2017-07-23 RX ADMIN — PIPERACILLIN SODIUM,TAZOBACTAM SODIUM 3.38 G: 3; .375 INJECTION, POWDER, FOR SOLUTION INTRAVENOUS at 17:35

## 2017-07-23 RX ADMIN — ENOXAPARIN SODIUM 40 MG: 40 INJECTION SUBCUTANEOUS at 20:25

## 2017-07-23 NOTE — ROUTINE PROCESS
TRANSFER - IN REPORT:    Verbal report received from KRISSY Faulkner RN(name) on [employer]  being received from [facility]) for routine post - op      Report consisted of patients Situation, Background, Assessment and   Recommendations(SBAR). Information from the following report(s) OR Summary was reviewed with the receiving nurse. Opportunity for questions and clarification was provided. Assessment completed upon patients arrival to unit and care assumed.

## 2017-07-23 NOTE — PROGRESS NOTES
Admit Date: 2017    POD 1 Day Post-Op    Procedure:  Procedure(s):  APPENDECTOMY LAPAROSCOPIC    Subjective:     Patient has no new complaints. Objective:     Blood pressure 131/87, pulse (!) 122, temperature 99.1 °F (37.3 °C), resp. rate 20, height 5' (1.524 m), weight 227 lb 11.8 oz (103.3 kg), last menstrual period 2017, SpO2 92 %, not currently breastfeeding. Temp (24hrs), Av °F (37.2 °C), Min:98.3 °F (36.8 °C), Max:100 °F (37.8 °C)      Physical Exam:  GENERAL: alert, cooperative, no distress, appears stated age, LUNG: nl effort, HEART: regular rate and rhythm, ABDOMEN: EUGENE cloudy serosang, EXTREMITIES:  extremities normal, atraumatic, no cyanosis or edema    Labs:   Recent Results (from the past 24 hour(s))   EKG, 12 LEAD, INITIAL    Collection Time: 17  2:34 PM   Result Value Ref Range    Ventricular Rate 154 BPM    Atrial Rate 154 BPM    P-R Interval 112 ms    QRS Duration 72 ms    Q-T Interval 320 ms    QTC Calculation (Bezet) 512 ms    Calculated R Axis 66 degrees    Calculated T Axis 67 degrees    Diagnosis       Sinus tachycardia  ST & T wave abnormality, consider anterior ischemia  Abnormal ECG  No previous ECGs available     METABOLIC PANEL, COMPREHENSIVE    Collection Time: 17  2:48 PM   Result Value Ref Range    Sodium 131 (L) 136 - 145 mmol/L    Potassium 3.6 3.5 - 5.1 mmol/L    Chloride 97 97 - 108 mmol/L    CO2 21 21 - 32 mmol/L    Anion gap 13 5 - 15 mmol/L    Glucose 328 (H) 65 - 100 mg/dL    BUN 10 6 - 20 MG/DL    Creatinine 0.94 0.55 - 1.02 MG/DL    BUN/Creatinine ratio 11 (L) 12 - 20      GFR est AA >60 >60 ml/min/1.73m2    GFR est non-AA >60 >60 ml/min/1.73m2    Calcium 9.7 8.5 - 10.1 MG/DL    Bilirubin, total 1.8 (H) 0.2 - 1.0 MG/DL    ALT (SGPT) 71 12 - 78 U/L    AST (SGOT) 36 15 - 37 U/L    Alk.  phosphatase 124 (H) 45 - 117 U/L    Protein, total 8.4 (H) 6.4 - 8.2 g/dL    Albumin 3.6 3.5 - 5.0 g/dL    Globulin 4.8 (H) 2.0 - 4.0 g/dL    A-G Ratio 0.8 (L) 1.1 - 2.2     CBC WITH AUTOMATED DIFF    Collection Time: 07/22/17  2:48 PM   Result Value Ref Range    WBC 10.5 3.6 - 11.0 K/uL    RBC 5.15 3.80 - 5.20 M/uL    HGB 15.3 11.5 - 16.0 g/dL    HCT 44.2 35.0 - 47.0 %    MCV 85.8 80.0 - 99.0 FL    MCH 29.7 26.0 - 34.0 PG    MCHC 34.6 30.0 - 36.5 g/dL    RDW 13.7 11.5 - 14.5 %    PLATELET 739 254 - 814 K/uL    NEUTROPHILS 67 %    BAND NEUTROPHILS 21 %    LYMPHOCYTES 9 %    MONOCYTES 2 %    EOSINOPHILS 0 %    BASOPHILS 0 %    METAMYELOCYTES 1 %    ABS. NEUTROPHILS 9.2 K/UL    ABS. LYMPHOCYTES 0.9 K/UL    ABS. MONOCYTES 0.2 K/UL    ABS. EOSINOPHILS 0.0 K/UL    ABS.  BASOPHILS 0.0 K/UL    RBC COMMENTS NORMOCYTIC, NORMOCHROMIC      WBC COMMENTS TOXIC GRANULATION     LIPASE    Collection Time: 07/22/17  2:48 PM   Result Value Ref Range    Lipase 86 73 - 393 U/L   LACTIC ACID    Collection Time: 07/22/17  2:48 PM   Result Value Ref Range    Lactic acid 2.1 (HH) 0.4 - 2.0 MMOL/L   URINALYSIS W/ REFLEX CULTURE    Collection Time: 07/22/17  3:23 PM   Result Value Ref Range    Color DARK YELLOW      Appearance CLOUDY (A) CLEAR      Specific gravity 1.039 (H) 1.003 - 1.030      pH (UA) 6.0 5.0 - 8.0      Protein 100 (A) NEG mg/dL    Glucose >1000 (A) NEG mg/dL    Ketone 80 (A) NEG mg/dL    Blood NEGATIVE  NEG      Urobilinogen 1.0 0.2 - 1.0 EU/dL    Nitrites NEGATIVE  NEG      Leukocyte Esterase NEGATIVE  NEG      WBC 5-10 0 - 4 /hpf    RBC 0-5 0 - 5 /hpf    Epithelial cells FEW FEW /lpf    Bacteria 2+ (A) NEG /hpf    UA:UC IF INDICATED URINE CULTURE ORDERED (A) CNI     BILIRUBIN, CONFIRM    Collection Time: 07/22/17  3:23 PM   Result Value Ref Range    Bilirubin UA, confirm NEGATIVE  NEG     HCG URINE, QL. - POC    Collection Time: 07/22/17  3:24 PM   Result Value Ref Range    Pregnancy test,urine (POC) NEGATIVE  NEG     CULTURE, BLOOD, PAIRED    Collection Time: 07/22/17  5:21 PM   Result Value Ref Range    Special Requests: NO SPECIAL REQUESTS      Culture result: NO GROWTH AFTER 15 HOURS     LACTIC ACID    Collection Time: 07/22/17  6:53 PM   Result Value Ref Range    Lactic acid 1.5 0.4 - 2.0 MMOL/L   METABOLIC PANEL, BASIC    Collection Time: 07/23/17  2:52 AM   Result Value Ref Range    Sodium 134 (L) 136 - 145 mmol/L    Potassium 4.2 3.5 - 5.1 mmol/L    Chloride 103 97 - 108 mmol/L    CO2 21 21 - 32 mmol/L    Anion gap 10 5 - 15 mmol/L    Glucose 241 (H) 65 - 100 mg/dL    BUN 7 6 - 20 MG/DL    Creatinine 0.61 0.55 - 1.02 MG/DL    BUN/Creatinine ratio 11 (L) 12 - 20      GFR est AA >60 >60 ml/min/1.73m2    GFR est non-AA >60 >60 ml/min/1.73m2    Calcium 8.2 (L) 8.5 - 10.1 MG/DL   CBC W/O DIFF    Collection Time: 07/23/17  2:52 AM   Result Value Ref Range    WBC 10.3 3.6 - 11.0 K/uL    RBC 4.42 3.80 - 5.20 M/uL    HGB 12.5 11.5 - 16.0 g/dL    HCT 38.8 35.0 - 47.0 %    MCV 87.8 80.0 - 99.0 FL    MCH 28.3 26.0 - 34.0 PG    MCHC 32.2 30.0 - 36.5 g/dL    RDW 14.1 11.5 - 14.5 %    PLATELET 919 160 - 992 K/uL       Data Review reviewed  I & O and labs    Assessment:     Active Problems:    Acute appendicitis (7/22/2017)      Elevated glucos  Plan/Recommendations/Medical Decision Making:     Continue present treatment  Diet  full liquid  check heme A1C   Dr Parth Tellez will assume Management in AM      Kaila Zhao MD, [facility]

## 2017-07-23 NOTE — ED NOTES
Patient assisted to bathroom at this time. Patient voided and placed back in bed. CHG wipes used on patients abdomen at this time.

## 2017-07-23 NOTE — ANESTHESIA POSTPROCEDURE EVALUATION
Post-Anesthesia Evaluation and Assessment    Patient: Roxanne Gruber MRN: [id number]  SSN: [id number]    YOB: 1980  Age: 40 y.o. Sex: female       Cardiovascular Function/Vital Signs  Visit Vitals    /87    Pulse (!) 116    Temp 37.3 °C (99.2 °F)    Resp 17    Ht 5' (1.524 m)    Wt 103.3 kg (227 lb 11.8 oz)    SpO2 94%    Breastfeeding No    BMI 44.48 kg/m2       Patient is status post general anesthesia for Procedure(s):  APPENDECTOMY LAPAROSCOPIC. Nausea/Vomiting: None    Postoperative hydration reviewed and adequate. Pain:  Pain Scale 1: Numeric (0 - 10) (07/22/17 2245)  Pain Intensity 1: 0 (07/22/17 2245)   Managed    Neurological Status:   Neuro (WDL): Exceptions to WDL (07/22/17 2227)  Neuro  Neurologic State: Drowsy; Eyes open spontaneously;Sleeping (07/22/17 2227)  Orientation Level: Oriented to person;Oriented to place;Oriented to situation (07/22/17 2227)  Cognition: Follows commands (07/22/17 2227)  Speech: Clear (07/22/17 2227)  LUE Motor Response: Purposeful (07/22/17 2227)  LLE Motor Response: Purposeful (07/22/17 2227)  RUE Motor Response: Purposeful (07/22/17 2227)  RLE Motor Response: Purposeful (07/22/17 2227)   At baseline    Mental Status and Level of Consciousness: Arousable    Pulmonary Status:   O2 Device: Nasal cannula (07/22/17 2240)   Adequate oxygenation and airway patent    Complications related to anesthesia: None    Post-anesthesia assessment completed.  No concerns    Signed By: Barbara Mederos MD     July 22, 2017

## 2017-07-23 NOTE — PROGRESS NOTES
End of Shift Nursing Note    Bedside shift change report given to Kelli Hummel (oncoming nurse) by Brock Rothman RN (offgoing nurse). Report included the following information SBAR, Kardex, OR Summary, Intake/Output, MAR and Recent Results. Zone Phone:   [phone]    Significant changes during shift:    none   Non-emergent issues for physician to address:   none     Number times ambulated in hallway past shift: 0      Number of times OOB to chair past shift: 0    POD #: 1     Vital Signs:    Temp: 98.9 °F (37.2 °C)     Pulse (Heart Rate): (!) 112     BP: 114/83     Resp Rate: 21     O2 Sat (%): 94 %    Lines & Drains:     Urinary Catheter? No    Central Line? No  PICC Line? No       NG tube [] in [] removed [x] not applicable   Drains [x] in [] removed [] not applicable     Skin Integrity:      Wounds: yes   Dressings Present: yes    Wound Concerns: no      GI:    Current diet:  DIET NPO  DIET CLEAR LIQUID    Nausea: NO  Vomiting: NO  Bowel Sounds: YES  Flatus: YES  Last Bowel Movement: yesterday   Appearance:     Respiratory:  Supplemental O2: Yes      Device: nasal cannula   via 3 Liters/min     Incentive Spirometer: YES  Volume:   Coughing and Deep Breathing: YES  Oral Care: YES  Understanding (patient/family education): YES   Getting out of bed: YES  Head of bed elevation: YES    Patient Safety:    Falls Score: 2  Mobility Score: 1  Bed Alarm On? No  Sitter? No      Opportunity for questions and clarification was given to oncoming nurse. Patient bed is in low position, side rails are up x 3, door & observation blinds open as needed, call bell within reach and patient not in distress.     Livier Cueto RN

## 2017-07-23 NOTE — PERIOP NOTES
TRANSFER - OUT REPORT:    Verbal report given to Ignacia TATUM(name) on [employer]  being transferred to 2234(unit) for routine progression of care       Report consisted of patients Situation, Background, Assessment and   Recommendations(SBAR). Information from the following report(s) OR Summary, Procedure Summary, Intake/Output and MAR was reviewed with the receiving nurse. Opportunity for questions and clarification was provided.       Patient transported with:   O2 @ 3 liters  Registered Nurse

## 2017-07-23 NOTE — OP NOTES
[address]   OP NOTE       Name:  Edilson Soliz   MR#:  [id number]   :  1980   Account #:  [de-identified]    Surgery Date:  2017   Date of Adm:  2017       PREOPERATIVE DIAGNOSIS: Ruptured appendix. POSTOPERATIVE DIAGNOSIS: Abscess in pelvis, perforated   gangrenous appendix free fecaliths. PROCEDURES PERFORMED: Laparoscopic appendectomy. SURGEON: Leslye Yi. Kimberley Dawkins MD    ANESTHESIA: General endotracheal tube. ESTIMATED BLOOD LOSS: 50 mL. SPECIMEN REMOVED: Appendix and multiple fecaliths. Appendix is   in pieces. COMPLICATIONS: None. IMPLANTS: None. DRAINS: EUGENE drain is placed in the right gutter at the base of the cecum   and comes out through our lower midline port. INDICATIONS FOR PROCEDURE: This is a 26-year-old obese female   who has been sick for at least 3-4 days, who comes into the   emergency room and has a CT scan showing evidence of a ruptured   appendix. She comes to surgery for laparoscopic appendectomy. FINDINGS: At the time of surgery, the patient was found to have an   abscess down in the pelvis as well as free fluid which is pus-like in   appearance. She has extensive reaction in the right lower quadrant   and is found to have a ruptured appendix with free fecaliths within the   right gutter. The appendix is gangrenous and comes out in pieces. Eventually a segment is found close to the cecum which can be   stapled off. EUGENE drain is left in the right gutter at the base of the cecum   and comes out our lower midline port site. DESCRIPTION OF PROCEDURE: With the patient in the supine   position under adequate general anesthesia, the abdomen is prepped   and draped in the usual fashion. After an appropriate time-out,   Marcaine with epinephrine is injected below the level of the umbilicus.    Incision is made at this level and carried down to the underlying midline fascia where a pursestring suture is placed in the midline   fascia. Incision is then made in the fascia and the incision is just off of   midline. Posterior fascia was picked up and incised, entering the   abdominal cavity under direct visualization. Gilberto trocar was placed   at this level and held in place using the pursestring suture. CO2 was   insufflated into the abdominal cavity and the bed is positioned with the   head down and turned towards the left to allow gravity to assist with   exposure. On examining the abdomen initially there is fluid in the   gutters and the area appears to be an abscess in the pelvis just   overlying the uterus. At this point in time, 2 additional ports are placed   in the abdominal cavity, a lower midline port and a left lower quadrant   port. At this point in time, the abscess in the pelvis is broken down,   irrigated and suctioned and then we proceeded to move omentum up   out of the right lower quadrant. This exposes the ascending colon and   cecum and the course of the terminal ileum is also identified. The fat   pad at this level is grasped, mobilizing the cecum further and exposing   a cavity with what appears to be a gangrenous appendix. Several free   fecaliths are identified and there is pus at this level as well. This was   irrigated and suctioned and attempts are made to grasp the remnants   of the appendix, and as this is performed it is a gangrenous appendix   and just comes apart in pieces. We continued to follow the appendix   down to its base and as we approached the cecum there is a small   segment of viable tissue where a stapler was placed and the base of   the appendix is transected. Multiple pieces of appendix and fecalith are   placed into an Endobag and delivered from the abdominal cavity as   our specimen. We were not able to actually define the mesoappendix   and there was some oozing along the edge of what would have been   the mesoappendix. Surgicel was applied to this area and left in place   for several minutes to provide hemostasis. Following this, extensive   irrigation and suctioning is performed, hemostasis is adequate. Irrigation is performed above the liver where there is cloudy fluid as   well, along the right gutter and down into the pelvis until clear. Our   staple line is again rechecked and the remnant of the mesoappendix is   also checked and found to have good hemostasis. At this point in time,   a EUGENE drain was brought into the abdominal cavity, laid in the right   gutter, and brought down along the base of the cecum and then out   through our lower midline trocar site. This was sutured at the level of   the skin and at this point in time the remaining ports were removed   under direct visualization. The majority of the pneumoperitoneum is   evacuated. The final port removed is that at the infraumbilical site   where the pursestring suture is tied down to close our fascial defect. Skin incisions were closed using subcuticular closures covered by   Dermabond. The drain was secured in place with a nylon suture and   applied to bulb suction. The patient is transferred to the recovery area   in stable condition having tolerated the procedure with needle, sponge   and instrument counts being reported as correct.         MD Callie Morales / Eusebio Terry   D:  07/22/2017   22:12   T:  07/23/2017   12:23   Job #:  [id number]

## 2017-07-23 NOTE — PROGRESS NOTES
Pharmacy Automatic Renal Dosing Protocol - Antimicrobials      Indication for Antimicrobials: Intraabdominal Infection  Current Regimen of Each Antimicrobial (Start Day & Day of Therapy):  Zosyn 3.375 gram iv q8h    Significant cultures: Paired blood culture , Urine culture       CAPD, Intermittent Hemodialysis or Renal Replacement Therapy: no  Paralysis, amputations, malnutrition: no  Recent Labs      17   1448   CREA  0.94   BUN  10   WBC  10.5     Temp (24hrs), Av °F (37.2 °C), Min:98.3 °F (36.8 °C), Max:100 °F (37.8 °C)    Creatinine Clearance (ml/min): 88.7            Impression/Plan:  Zosyn dosing adjusted to 3.375 gram iv q8h (each dose infused over 4 hours) as per protocol Xavier Braxton will follow daily and adjust doses of monitored medications as appropriate for renal function and/or serum levels. Thank you,  Jonathon Sloan PHARMD           Loading dose entered? Yes   Daily BMP ordered? Yes   Maintenance dose entered? Yes   Previous order discontinued? Yes   Progress note entered? Yes   Serum Level(s) ordered? Not applicable       Renal Dosing Tables on the Pharmacy Web Site                Pharmacist will perform automatic renal dosage adjustment for certain medications   Antimicrobials: acyclovir, aminoglycosides, amoxicillin, amoxicillin/clavulanate, ampicillin, ampicillin/sulbactam, aztreonam, cefazolin, cefepime, cefotetan, ceftazidime, ciprofloxacin, colistin, daptomycin, doripenem, ertapenem,  ethambutol, fluconazole, imipenem/cilastatin, levofloxacin, linezolid, meropenem, oseltamivir, piperacillin/tazobactam, pyrazinamide, valacyclovir, vancomycin   Creatinine Clearance will be calculated using the Cockroft-Gault equation, using the lessor of ideal or actual body weight.  Monitoring will continue daily for any medication that is changed and appropriate adjustments will be made as needed for the duration of therapy.    Upon initiation of renal dosing or when a dosage adjustment is made, the pharmacist will write a progress note with the indication, calculated creatinine clearance, and dosing regimen. The pharmacist will use per protocol when changing orders.

## 2017-07-23 NOTE — PROGRESS NOTES
End of Shift Nursing Note    Bedside shift change report given to Bridget Bazna (oncoming nurse) by Pino Lerma (offgoing nurse). Report included the following information SBAR, Kardex, Intake/Output, MAR and Recent Results. Zone Phone:   [phone]    Significant changes during shift:    none   Non-emergent issues for physician to address:   none     Number times ambulated in hallway past shift: 0; but ambulated in room three times      Number of times OOB to chair past shift: 3    POD #: 1     Vital Signs:    Temp: 99.2 °F (37.3 °C)     Pulse (Heart Rate): (!) 112     BP: 138/67     Resp Rate: 18     O2 Sat (%): 94 %    Lines & Drains:     Urinary Catheter? No     Central Line? No     PICC Line? No       NG tube [] in [] removed [x] not applicable   Drains [x] in [] removed [] not applicable     Skin Integrity:      Wounds: yes   Dressings Present: yes    Wound Concerns: no      GI:    Current diet:  DIET FULL LIQUID    Nausea: NO  Vomiting: NO  Bowel Sounds: YES  Flatus: NO  Last Bowel Movement: yesterday       Respiratory:  Supplemental O2: Yes      Device: nasal cannula   via 2 Liters/min     Incentive Spirometer: YES  Volume: 750  Coughing and Deep Breathing: YES  Oral Care: YES  Understanding (patient/family education): YES   Getting out of bed: YES  Head of bed elevation: YES    Patient Safety:    Falls Score: 2  Mobility Score: 1  Bed Alarm On? No  Sitter? No      Opportunity for questions and clarification was given to oncoming nurse. Patient bed is in low position, side rails are up x 2, door & observation blinds open as needed, call bell within reach and patient not in distress.     Bill Nirmala

## 2017-07-23 NOTE — ED NOTES
TRANSFER - OUT REPORT:    Verbal report given to [facility] (name) on [employer]  being transferred to OR (unit) for ordered procedure       Report consisted of patients Situation, Background, Assessment and   Recommendations(SBAR). Information from the following report(s) SBAR, Kardex, ED Summary, Intake/Output, MAR and Accordion was reviewed with the receiving nurse. Lines:   Peripheral IV 07/22/17 Left Antecubital (Active)   Site Assessment Clean, dry, & intact 7/22/2017  2:34 PM   Phlebitis Assessment 0 7/22/2017  2:34 PM   Infiltration Assessment 0 7/22/2017  2:34 PM   Dressing Status Clean, dry, & intact 7/22/2017  2:34 PM   Dressing Type Tape 7/22/2017  2:34 PM   Hub Color/Line Status Pink;Flushed 7/22/2017  2:34 PM   Action Taken Blood drawn 7/22/2017  2:34 PM        Opportunity for questions and clarification was provided.       Patient transported with:   Registered Nurse

## 2017-07-23 NOTE — BRIEF OP NOTE
BRIEF OPERATIVE NOTE    Date of Procedure: 7/22/2017   Preoperative Diagnosis: appendicitis,ruptured  Postoperative Diagnosis: Perforated Appendix, Abcess in Pelvis    Procedure(s):  APPENDECTOMY LAPAROSCOPIC  Surgeon(s) and Role:     * Tiara Aceves MD - Primary         Assistant Staff:       Surgical Staff:  Circ-1: Jacky Yu RN  Scrub Tech-1: Joshua Amaro  Surg Asst-1: Jamari Goel  Event Time In   Incision Start 2055   Incision Close      Anesthesia: General   Estimated Blood Loss:50cc  Specimens:   ID Type Source Tests Collected by Time Destination   1 : Appendix in multiple pieces with Fecolith Preservative Appendix  Tiara Aceves MD 7/22/2017 2154 Pathology      Findings:abscess in pelvis, perforated gangrenous appendix, free fecolithes  Complications:  Implants: * No implants in log *  EUGENE drain right gutter base of cecum      Samantha Armas.  [facility]          [phone]

## 2017-07-23 NOTE — PROGRESS NOTES
MEWS 3. Looking back at chart pt has generated a MEWS of 3 before. Pt has had increased HR post surgery. MD aware, will continue to monitor.

## 2017-07-24 LAB
ANION GAP BLD CALC-SCNC: 4 MMOL/L (ref 5–15)
APPEARANCE UR: ABNORMAL
BACTERIA SPEC CULT: NORMAL
BACTERIA URNS QL MICRO: ABNORMAL /HPF
BUN SERPL-MCNC: 8 MG/DL (ref 6–20)
BUN/CREAT SERPL: 16 (ref 12–20)
CALCIUM SERPL-MCNC: 8.3 MG/DL (ref 8.5–10.1)
CC UR VC: NORMAL
CHLORIDE SERPL-SCNC: 103 MMOL/L (ref 97–108)
CO2 SERPL-SCNC: 27 MMOL/L (ref 21–32)
COLOR UR: ABNORMAL
CREAT SERPL-MCNC: 0.49 MG/DL (ref 0.55–1.02)
EPITH CASTS URNS QL MICRO: ABNORMAL /LPF
ERYTHROCYTE [DISTWIDTH] IN BLOOD BY AUTOMATED COUNT: 13.9 % (ref 11.5–14.5)
EST. AVERAGE GLUCOSE BLD GHB EST-MCNC: 252 MG/DL
GLUCOSE SERPL-MCNC: 181 MG/DL (ref 65–100)
GLUCOSE UR STRIP.AUTO-MCNC: >1000 MG/DL
HBA1C MFR BLD: 10.4 % (ref 4.2–6.3)
HCT VFR BLD AUTO: 31.4 % (ref 35–47)
HGB BLD-MCNC: 10.5 G/DL (ref 11.5–16)
HGB UR QL STRIP: NEGATIVE
KETONES UR QL STRIP.AUTO: 80 MG/DL
LEUKOCYTE ESTERASE UR QL STRIP.AUTO: NEGATIVE
MCH RBC QN AUTO: 29.2 PG (ref 26–34)
MCHC RBC AUTO-ENTMCNC: 33.4 G/DL (ref 30–36.5)
MCV RBC AUTO: 87.5 FL (ref 80–99)
NITRITE UR QL STRIP.AUTO: NEGATIVE
PH UR STRIP: 6 [PH] (ref 5–8)
PLATELET # BLD AUTO: 170 K/UL (ref 150–400)
POTASSIUM SERPL-SCNC: 3.2 MMOL/L (ref 3.5–5.1)
PROT UR STRIP-MCNC: 100 MG/DL
RBC # BLD AUTO: 3.59 M/UL (ref 3.8–5.2)
RBC #/AREA URNS HPF: ABNORMAL /HPF (ref 0–5)
SERVICE CMNT-IMP: NORMAL
SODIUM SERPL-SCNC: 134 MMOL/L (ref 136–145)
SP GR UR REFRACTOMETRY: >1.03 (ref 1–1.03)
UA: UC IF INDICATED,UAUC: ABNORMAL
UROBILINOGEN UR QL STRIP.AUTO: 1 EU/DL (ref 0.2–1)
WBC # BLD AUTO: 9.7 K/UL (ref 3.6–11)
WBC URNS QL MICRO: ABNORMAL /HPF (ref 0–4)

## 2017-07-24 PROCEDURE — 80048 BASIC METABOLIC PNL TOTAL CA: CPT | Performed by: FAMILY MEDICINE

## 2017-07-24 PROCEDURE — 74011000258 HC RX REV CODE- 258: Performed by: FAMILY MEDICINE

## 2017-07-24 PROCEDURE — 85027 COMPLETE CBC AUTOMATED: CPT | Performed by: FAMILY MEDICINE

## 2017-07-24 PROCEDURE — 74011250637 HC RX REV CODE- 250/637: Performed by: FAMILY MEDICINE

## 2017-07-24 PROCEDURE — 74011250636 HC RX REV CODE- 250/636: Performed by: FAMILY MEDICINE

## 2017-07-24 PROCEDURE — 77010033678 HC OXYGEN DAILY

## 2017-07-24 PROCEDURE — 83036 HEMOGLOBIN GLYCOSYLATED A1C: CPT | Performed by: FAMILY MEDICINE

## 2017-07-24 PROCEDURE — 36415 COLL VENOUS BLD VENIPUNCTURE: CPT | Performed by: FAMILY MEDICINE

## 2017-07-24 PROCEDURE — 65270000029 HC RM PRIVATE

## 2017-07-24 RX ADMIN — Medication 10 ML: at 21:33

## 2017-07-24 RX ADMIN — ENOXAPARIN SODIUM 40 MG: 40 INJECTION SUBCUTANEOUS at 09:35

## 2017-07-24 RX ADMIN — PIPERACILLIN SODIUM,TAZOBACTAM SODIUM 3.38 G: 3; .375 INJECTION, POWDER, FOR SOLUTION INTRAVENOUS at 18:11

## 2017-07-24 RX ADMIN — HYDROMORPHONE HYDROCHLORIDE 1 MG: 1 INJECTION, SOLUTION INTRAMUSCULAR; INTRAVENOUS; SUBCUTANEOUS at 09:37

## 2017-07-24 RX ADMIN — ENOXAPARIN SODIUM 40 MG: 40 INJECTION SUBCUTANEOUS at 21:33

## 2017-07-24 RX ADMIN — HYDROMORPHONE HYDROCHLORIDE 1 MG: 1 INJECTION, SOLUTION INTRAMUSCULAR; INTRAVENOUS; SUBCUTANEOUS at 15:49

## 2017-07-24 RX ADMIN — HYDROCODONE BITARTRATE AND ACETAMINOPHEN 2 TABLET: 5; 325 TABLET ORAL at 21:37

## 2017-07-24 RX ADMIN — PIPERACILLIN SODIUM,TAZOBACTAM SODIUM 3.38 G: 3; .375 INJECTION, POWDER, FOR SOLUTION INTRAVENOUS at 09:35

## 2017-07-24 RX ADMIN — HYDROMORPHONE HYDROCHLORIDE 1 MG: 1 INJECTION, SOLUTION INTRAMUSCULAR; INTRAVENOUS; SUBCUTANEOUS at 02:57

## 2017-07-24 RX ADMIN — SODIUM CHLORIDE 75 ML/HR: 900 INJECTION, SOLUTION INTRAVENOUS at 07:00

## 2017-07-24 RX ADMIN — HYDROMORPHONE HYDROCHLORIDE 1 MG: 1 INJECTION, SOLUTION INTRAMUSCULAR; INTRAVENOUS; SUBCUTANEOUS at 07:03

## 2017-07-24 RX ADMIN — Medication 5 ML: at 06:00

## 2017-07-24 RX ADMIN — PIPERACILLIN SODIUM,TAZOBACTAM SODIUM 3.38 G: 3; .375 INJECTION, POWDER, FOR SOLUTION INTRAVENOUS at 01:59

## 2017-07-24 RX ADMIN — Medication 10 ML: at 15:50

## 2017-07-24 RX ADMIN — Medication 10 ML: at 02:58

## 2017-07-24 NOTE — CDMP QUERY
Patient is noted to have a BMI of 44.48 by anesthesia. Please clarify if this patient is:     =>Morbidly obese (BMI ³ 40)  =>Obese (BMI 30 - 39.9)  =>Overweight (BMI 25 - 29.9)  =>Other explanation of clinical findings  =>Unable to determine (no explanation for clinical findings)    Presentation: 5', 227 lbs 11.8 oz = BMI 44.48    REFERENCE:  The [address] [place] [address] has issued a statement indicating that, \"Individuals who are overweight, obese, or morbidly obese are at an increased risk for certain medical conditions when compared to persons of normal weight. Therefore, these conditions are always clinically significant and reportable when documented by the provider. Please clarify and document your clinical opinion in the progress notes and discharge summary including the definitive and/or presumptive diagnosis, (suspected or probable), related to the above clinical findings. Please include clinical findings supporting your diagnosis.     Thank you, Pat/RILEY  [phone]

## 2017-07-24 NOTE — DIABETES MGMT
DTC Progress Note    Recommendations/ Comments:  Chart reviewed for elevated A1C of 10.4% which is indicative of diabetes. Please consider adding Lispro Correctional insulin with normal sensitivity. Please consult DTC if education is desired. Chart reviewed on Toni Bro. Patient is a 40 y.o. female with no noted history of diabetes. A1c:   Lab Results   Component Value Date/Time    Hemoglobin A1c 10.4 07/24/2017 01:46 AM       Recent Glucose Results: Lab Results   Component Value Date/Time     (H) 07/24/2017 01:46 AM        Lab Results   Component Value Date/Time    Creatinine 0.49 07/24/2017 01:46 AM     Estimated Creatinine Clearance: 170.2 mL/min (based on Cr of 0.49). Active Orders   Diet    DIET FULL LIQUID        PO intake: Patient Vitals for the past 72 hrs:   % Diet Eaten   07/24/17 0941 20 %   07/23/17 1734 100 %   07/23/17 1335 100 %   07/23/17 1006 40 %       Current hospital DM medication: none    Will continue to follow as needed. Thank you.   Tere Pedroza, [address], [address]  Office:  [phone]

## 2017-07-24 NOTE — PROGRESS NOTES
End of Shift Nursing Note    Bedside shift change report given to Ivon Schroeder (oncoming nurse) by Ngoc Bedoya (offgoing nurse). Report included the following information SBAR, Kardex, Intake/Output, MAR and Recent Results. Zone Phone:   [phone]    Significant changes during shift:    none   Non-emergent issues for physician to address:   none     Number times ambulated in hallway past shift: 3      Number of times OOB to chair past shift: 3    POD #: 2     Vital Signs:    Temp: 99.6 °F (37.6 °C)     Pulse (Heart Rate): (!) 120     BP: 144/90     Resp Rate: 16     O2 Sat (%): 95 %    Lines & Drains:     Urinary Catheter? No     Central Line? No    PICC Line? No       NG tube [] in [] removed [x] not applicable   Drains [x] in [] removed [] not applicable     Skin Integrity:      Wounds: yes   Dressings Present: yes    Wound Concerns: no      GI:    Current diet:  DIET FULL LIQUID    Nausea: NO  Vomiting: NO  Bowel Sounds: YES  Flatus: YES  Last Bowel Movement: several days ago     Respiratory:  Supplemental O2: Yes      Device: nasal cannula   via 2 Liters/min     Incentive Spirometer: YES  Volume: 750  Coughing and Deep Breathing: YES  Oral Care: YES  Understanding (patient/family education): YES   Getting out of bed: YES  Head of bed elevation: YES    Patient Safety:    Falls Score: 2  Mobility Score: 1  Bed Alarm On? No  Sitter? No      Opportunity for questions and clarification was given to oncoming nurse. Patient bed is in low position, side rails are up x 2, door & observation blinds open as needed, call bell within reach and patient not in distress.     Coy Bar

## 2017-07-24 NOTE — PROGRESS NOTES
Admit Date: 2017    POD 2 Days Post-Op    Procedure:  Procedure(s):  APPENDECTOMY LAPAROSCOPIC    Subjective:     Patient has no new complaints. Objective:     Blood pressure 124/73, pulse (!) 118, temperature 99.8 °F (37.7 °C), resp. rate 18, height 5' (1.524 m), weight 227 lb 11.8 oz (103.3 kg), last menstrual period 2017, SpO2 97 %, not currently breastfeeding.     Temp (24hrs), Av.2 °F (37.3 °C), Min:98.8 °F (37.1 °C), Max:99.8 °F (37.7 °C)      Physical Exam:  GENERAL: alert, cooperative, no distress, appears stated age, LUNG: clear to auscultation bilaterally, HEART: regular rate and rhythm, S1, S2 normal, no murmur, click, rub or gallop, ABDOMEN: soft, obese, wounds dressed and dry, EUGENE o/p serosanguinous, EXTREMITIES:  extremities normal, atraumatic, no cyanosis or edema    Labs:   Recent Results (from the past 24 hour(s))   HEMOGLOBIN A1C WITH EAG    Collection Time: 17  1:46 AM   Result Value Ref Range    Hemoglobin A1c 10.4 (H) 4.2 - 6.3 %    Est. average glucose 252 mg/dL   CBC W/O DIFF    Collection Time: 17  1:46 AM   Result Value Ref Range    WBC 9.7 3.6 - 11.0 K/uL    RBC 3.59 (L) 3.80 - 5.20 M/uL    HGB 10.5 (L) 11.5 - 16.0 g/dL    HCT 31.4 (L) 35.0 - 47.0 %    MCV 87.5 80.0 - 99.0 FL    MCH 29.2 26.0 - 34.0 PG    MCHC 33.4 30.0 - 36.5 g/dL    RDW 13.9 11.5 - 14.5 %    PLATELET 934 731 - 622 K/uL   METABOLIC PANEL, BASIC    Collection Time: 17  1:46 AM   Result Value Ref Range    Sodium 134 (L) 136 - 145 mmol/L    Potassium 3.2 (L) 3.5 - 5.1 mmol/L    Chloride 103 97 - 108 mmol/L    CO2 27 21 - 32 mmol/L    Anion gap 4 (L) 5 - 15 mmol/L    Glucose 181 (H) 65 - 100 mg/dL    BUN 8 6 - 20 MG/DL    Creatinine 0.49 (L) 0.55 - 1.02 MG/DL    BUN/Creatinine ratio 16 12 - 20      GFR est AA >60 >60 ml/min/1.73m2    GFR est non-AA >60 >60 ml/min/1.73m2    Calcium 8.3 (L) 8.5 - 10.1 MG/DL       Data Review images and reports reviewed    Assessment:     Active Problems: Acute appendicitis (7/22/2017)      Perforated appendix (7/23/2017)        Plan/Recommendations/Medical Decision Making:     Continue present treatment   Remains tachycardic  WBC normal  Good urine output  Taking po  Remain on iv antibiotics for severe perforated appendicitis with intraabdominal abscess     Roxanna Alas.  Nino Tubbs MD, [facility]

## 2017-07-24 NOTE — PROGRESS NOTES
End of Shift Nursing Note    Bedside shift change report given to Kelli Hummel (oncoming nurse) by Sita Degroot RN (offgoing nurse). Report included the following information SBAR, Kardex, OR Summary, Intake/Output, MAR and Recent Results. Zone Phone:       Significant changes during shift:    Hgb A1c drawn with routine am labs   Non-emergent issues for physician to address:   none     Number times ambulated in hallway past shift: 1      Number of times OOB to chair past shift: 1    POD #: 2     Vital Signs:    Temp: 99 °F (37.2 °C)     Pulse (Heart Rate): (!) 112     BP: 114/69     Resp Rate: 20     O2 Sat (%): 97 %    Lines & Drains:     Urinary Catheter? No       NG tube [] in [] removed [x] not applicable   Drains [x] in [] removed [] not applicable     Skin Integrity:      Wounds: yes   Dressings Present: yes    Wound Concerns: no      GI:    Current diet:  DIET FULL LIQUID    Nausea: NO  Vomiting: NO  Bowel Sounds: YES  Flatus: NO  Last Bowel Movement: several days ago   Appearance:     Respiratory:  Supplemental O2: Yes      Device: nasal cannula    via 2 Liters/min     Incentive Spirometer: YES  Volume: 750  Coughing and Deep Breathing: YES  Oral Care: YES  Understanding (patient/family education): YES   Getting out of bed: YES  Head of bed elevation: YES    Patient Safety:    Falls Score: 1  Mobility Score: 0  Bed Alarm On? No  Sitter? No      Opportunity for questions and clarification was given to oncoming nurse. Patient bed is in low position, side rails are up x 2, door & observation blinds open as needed, call bell within reach and patient not in distress.     Margot Solis RN

## 2017-07-25 LAB
ANION GAP BLD CALC-SCNC: 7 MMOL/L (ref 5–15)
BUN SERPL-MCNC: 5 MG/DL (ref 6–20)
BUN/CREAT SERPL: 10 (ref 12–20)
CALCIUM SERPL-MCNC: 8.6 MG/DL (ref 8.5–10.1)
CHLORIDE SERPL-SCNC: 100 MMOL/L (ref 97–108)
CO2 SERPL-SCNC: 29 MMOL/L (ref 21–32)
CREAT SERPL-MCNC: 0.52 MG/DL (ref 0.55–1.02)
GLUCOSE BLD STRIP.AUTO-MCNC: 201 MG/DL (ref 65–100)
GLUCOSE BLD STRIP.AUTO-MCNC: 225 MG/DL (ref 65–100)
GLUCOSE BLD STRIP.AUTO-MCNC: 274 MG/DL (ref 65–100)
GLUCOSE SERPL-MCNC: 158 MG/DL (ref 65–100)
POTASSIUM SERPL-SCNC: 3.2 MMOL/L (ref 3.5–5.1)
SERVICE CMNT-IMP: ABNORMAL
SODIUM SERPL-SCNC: 136 MMOL/L (ref 136–145)

## 2017-07-25 PROCEDURE — 36415 COLL VENOUS BLD VENIPUNCTURE: CPT | Performed by: FAMILY MEDICINE

## 2017-07-25 PROCEDURE — 74011250636 HC RX REV CODE- 250/636: Performed by: FAMILY MEDICINE

## 2017-07-25 PROCEDURE — 74011636637 HC RX REV CODE- 636/637: Performed by: SURGERY

## 2017-07-25 PROCEDURE — 74011636637 HC RX REV CODE- 636/637: Performed by: HOSPITALIST

## 2017-07-25 PROCEDURE — 80048 BASIC METABOLIC PNL TOTAL CA: CPT | Performed by: FAMILY MEDICINE

## 2017-07-25 PROCEDURE — 74011000258 HC RX REV CODE- 258: Performed by: FAMILY MEDICINE

## 2017-07-25 PROCEDURE — 82962 GLUCOSE BLOOD TEST: CPT

## 2017-07-25 PROCEDURE — 65270000029 HC RM PRIVATE

## 2017-07-25 RX ORDER — INSULIN LISPRO 100 [IU]/ML
INJECTION, SOLUTION INTRAVENOUS; SUBCUTANEOUS EVERY 6 HOURS
Status: DISCONTINUED | OUTPATIENT
Start: 2017-07-25 | End: 2017-07-25

## 2017-07-25 RX ORDER — INSULIN LISPRO 100 [IU]/ML
INJECTION, SOLUTION INTRAVENOUS; SUBCUTANEOUS
Status: DISCONTINUED | OUTPATIENT
Start: 2017-07-25 | End: 2017-07-26 | Stop reason: HOSPADM

## 2017-07-25 RX ORDER — MAGNESIUM SULFATE 100 %
4 CRYSTALS MISCELLANEOUS AS NEEDED
Status: DISCONTINUED | OUTPATIENT
Start: 2017-07-25 | End: 2017-07-26 | Stop reason: HOSPADM

## 2017-07-25 RX ORDER — INSULIN GLARGINE 100 [IU]/ML
5 INJECTION, SOLUTION SUBCUTANEOUS
Status: DISCONTINUED | OUTPATIENT
Start: 2017-07-25 | End: 2017-07-25

## 2017-07-25 RX ORDER — INSULIN GLARGINE 100 [IU]/ML
10 INJECTION, SOLUTION SUBCUTANEOUS
Status: DISCONTINUED | OUTPATIENT
Start: 2017-07-25 | End: 2017-07-26 | Stop reason: HOSPADM

## 2017-07-25 RX ORDER — DEXTROSE 50 % IN WATER (D50W) INTRAVENOUS SYRINGE
12.5-25 AS NEEDED
Status: DISCONTINUED | OUTPATIENT
Start: 2017-07-25 | End: 2017-07-26 | Stop reason: HOSPADM

## 2017-07-25 RX ADMIN — Medication 10 ML: at 22:37

## 2017-07-25 RX ADMIN — Medication 5 ML: at 06:00

## 2017-07-25 RX ADMIN — ENOXAPARIN SODIUM 40 MG: 40 INJECTION SUBCUTANEOUS at 20:14

## 2017-07-25 RX ADMIN — INSULIN LISPRO 2 UNITS: 100 INJECTION, SOLUTION INTRAVENOUS; SUBCUTANEOUS at 22:37

## 2017-07-25 RX ADMIN — HYDROMORPHONE HYDROCHLORIDE 1 MG: 1 INJECTION, SOLUTION INTRAMUSCULAR; INTRAVENOUS; SUBCUTANEOUS at 20:14

## 2017-07-25 RX ADMIN — HYDROMORPHONE HYDROCHLORIDE 1 MG: 1 INJECTION, SOLUTION INTRAMUSCULAR; INTRAVENOUS; SUBCUTANEOUS at 10:25

## 2017-07-25 RX ADMIN — INSULIN GLARGINE 10 UNITS: 100 INJECTION, SOLUTION SUBCUTANEOUS at 23:00

## 2017-07-25 RX ADMIN — PIPERACILLIN SODIUM,TAZOBACTAM SODIUM 3.38 G: 3; .375 INJECTION, POWDER, FOR SOLUTION INTRAVENOUS at 17:53

## 2017-07-25 RX ADMIN — SODIUM CHLORIDE 75 ML/HR: 900 INJECTION, SOLUTION INTRAVENOUS at 14:32

## 2017-07-25 RX ADMIN — INSULIN LISPRO 5 UNITS: 100 INJECTION, SOLUTION INTRAVENOUS; SUBCUTANEOUS at 13:09

## 2017-07-25 RX ADMIN — PIPERACILLIN SODIUM,TAZOBACTAM SODIUM 3.38 G: 3; .375 INJECTION, POWDER, FOR SOLUTION INTRAVENOUS at 02:57

## 2017-07-25 RX ADMIN — ENOXAPARIN SODIUM 40 MG: 40 INJECTION SUBCUTANEOUS at 08:45

## 2017-07-25 RX ADMIN — HYDROMORPHONE HYDROCHLORIDE 1 MG: 1 INJECTION, SOLUTION INTRAMUSCULAR; INTRAVENOUS; SUBCUTANEOUS at 22:37

## 2017-07-25 RX ADMIN — Medication 10 ML: at 20:18

## 2017-07-25 RX ADMIN — INSULIN LISPRO 3 UNITS: 100 INJECTION, SOLUTION INTRAVENOUS; SUBCUTANEOUS at 17:54

## 2017-07-25 RX ADMIN — PIPERACILLIN SODIUM,TAZOBACTAM SODIUM 3.38 G: 3; .375 INJECTION, POWDER, FOR SOLUTION INTRAVENOUS at 10:25

## 2017-07-25 RX ADMIN — Medication 10 ML: at 15:07

## 2017-07-25 NOTE — PROGRESS NOTES
End of Shift Nursing Note    Bedside shift change report given to Jaspreet Beni Sterling (oncoming nurse) by Live Traore RN (offgoing nurse). Report included the following information SBAR, Kardex, OR Summary, Intake/Output, MAR and Recent Results. Zone Phone:       Significant changes during shift:    Passing flatus, on RA   Non-emergent issues for physician to address:   none     Number times ambulated in hallway past shift:       Number of times OOB to chair past shift: 2    POD #: 2     Vital Signs:    Temp: 98.9 °F (37.2 °C)     Pulse (Heart Rate): (!) 113     BP: 132/89     Resp Rate: 20     O2 Sat (%): 97 %    Lines & Drains:      NG tube [] in [] removed [x] not applicable   Drains [x] in [] removed [] not applicable     Skin Integrity:      Wounds: yes   Dressings Present: yes    Wound Concerns: no      GI:    Current diet:  DIET FULL LIQUID    Nausea: NO  Vomiting: NO  Bowel Sounds: YES  Flatus: YES  Last Bowel Movement: several days ago   Appearance:     Respiratory: On RA       Incentive Spirometer: YES  Volume: 750  Coughing and Deep Breathing: YES  Oral Care: YES  Understanding (patient/family education): YES   Getting out of bed: YES  Head of bed elevation: YES    Patient Safety:    Falls Score: 1  Mobility Score: 1  Bed Alarm On? No  Sitter? No      Opportunity for questions and clarification was given to oncoming nurse. Patient bed is in low position, side rails are up x 2, door & observation blinds open as needed, call bell within reach and patient not in distress.     Garrett Arroyo RN

## 2017-07-25 NOTE — PROGRESS NOTES
Admit Date: 2017    POD 3 Days Post-Op    Procedure:  Procedure(s):  APPENDECTOMY LAPAROSCOPIC    Subjective:     Patient has no new complaints. [facility] lite diet    Objective:     Blood pressure 131/85, pulse (!) 116, temperature 98.4 °F (36.9 °C), resp. rate 16, height 5' (1.524 m), weight 227 lb 11.8 oz (103.3 kg), last menstrual period 2017, SpO2 90 %, not currently breastfeeding. Temp (24hrs), Av.9 °F (37.2 °C), Min:98 °F (36.7 °C), Max:100.1 °F (37.8 °C)      Physical Exam:  GENERAL: alert, cooperative, no distress, appears stated age, LUNG: clear to auscultation bilaterally, HEART: regular rate and rhythm, S1, S2 normal, no murmur, click, rub or gallop, ABDOMEN: soft, obese, wounds dressed and dry, EUGENE o/p serosanguinous, EXTREMITIES:  extremities normal, atraumatic, no cyanosis or edema    Labs:   Recent Results (from the past 24 hour(s))   METABOLIC PANEL, BASIC    Collection Time: 17  3:00 AM   Result Value Ref Range    Sodium 136 136 - 145 mmol/L    Potassium 3.2 (L) 3.5 - 5.1 mmol/L    Chloride 100 97 - 108 mmol/L    CO2 29 21 - 32 mmol/L    Anion gap 7 5 - 15 mmol/L    Glucose 158 (H) 65 - 100 mg/dL    BUN 5 (L) 6 - 20 MG/DL    Creatinine 0.52 (L) 0.55 - 1.02 MG/DL    BUN/Creatinine ratio 10 (L) 12 - 20      GFR est AA >60 >60 ml/min/1.73m2    GFR est non-AA >60 >60 ml/min/1.73m2    Calcium 8.6 8.5 - 10.1 MG/DL       Data Review images and reports reviewed    Assessment:     Active Problems:    Acute appendicitis (2017)      Perforated appendix (2017)        Plan/Recommendations/Medical Decision Making:     Continue present treatment   Remains tachycardic  Good urine output  Taking po  Remain on iv antibiotics for severe perforated appendicitis with intraabdominal abscess   Sugars have been high, now with HbA1C 10.4  Will get hospitalist consult for evaluation of newly identified diabetes    Ravi D.  Santos Gutierrez, MD, [facility]

## 2017-07-25 NOTE — PROGRESS NOTES
Interdisciplinary Rounds were completed on this patient. Rounds included nursing, clinical care leader, pharmacy, and case management. Patient was doing well without problems. Patient had the following concerns: none. Goals for the day will include: mobilize and incentive spirometer.

## 2017-07-25 NOTE — PROGRESS NOTES
Pt was admitted for ruptured appendicitis. Pt was alert and oriented, with spouse by bedside, upon CM room visit. Pt reported that she resides with spouse in their 2 story home (one step into main entrance). Pt reported that she is independent with ADLs, and she drives. Pt reported that she is active with her PCP: last seen June 2017 and she uses [facility]. Pt reported no DME, HH/SNF. CM will continue to follow up with pt and make referrals as deemed necessary. Care Management Interventions  PCP Verified by CM: Yes  Mode of Transport at Discharge:  Other (see comment)  Transition of Care Consult (CM Consult): Discharge Planning  Discharge Durable Medical Equipment: No  Physical Therapy Consult: No  Occupational Therapy Consult: No  Speech Therapy Consult: No  Current Support Network: Lives with Spouse, Own Home  Confirm Follow Up Transport: Family  Plan discussed with Pt/Family/Caregiver: Yes  Discharge Location  Discharge Placement: [address], MSW   [phone]

## 2017-07-26 VITALS
WEIGHT: 227.74 LBS | RESPIRATION RATE: 16 BRPM | HEIGHT: 60 IN | TEMPERATURE: 98.8 F | DIASTOLIC BLOOD PRESSURE: 87 MMHG | HEART RATE: 110 BPM | OXYGEN SATURATION: 95 % | BODY MASS INDEX: 44.71 KG/M2 | SYSTOLIC BLOOD PRESSURE: 135 MMHG

## 2017-07-26 LAB
ANION GAP BLD CALC-SCNC: 6 MMOL/L (ref 5–15)
BASOPHILS # BLD AUTO: 0 K/UL
BASOPHILS # BLD: 0 %
BUN SERPL-MCNC: 4 MG/DL (ref 6–20)
BUN/CREAT SERPL: 9 (ref 12–20)
CALCIUM SERPL-MCNC: 8.2 MG/DL (ref 8.5–10.1)
CHLORIDE SERPL-SCNC: 102 MMOL/L (ref 97–108)
CO2 SERPL-SCNC: 29 MMOL/L (ref 21–32)
CREAT SERPL-MCNC: 0.44 MG/DL (ref 0.55–1.02)
DIFFERENTIAL METHOD BLD: ABNORMAL
EOSINOPHIL # BLD: 0 K/UL
EOSINOPHIL NFR BLD: 0 %
ERYTHROCYTE [DISTWIDTH] IN BLOOD BY AUTOMATED COUNT: 14.1 % (ref 11.5–14.5)
GLUCOSE BLD STRIP.AUTO-MCNC: 171 MG/DL (ref 65–100)
GLUCOSE BLD STRIP.AUTO-MCNC: 265 MG/DL (ref 65–100)
GLUCOSE SERPL-MCNC: 161 MG/DL (ref 65–100)
HCT VFR BLD AUTO: 31.5 % (ref 35–47)
HGB BLD-MCNC: 10.1 G/DL (ref 11.5–16)
LYMPHOCYTES # BLD AUTO: 25 %
LYMPHOCYTES # BLD: 2.2 K/UL
MCH RBC QN AUTO: 28.1 PG (ref 26–34)
MCHC RBC AUTO-ENTMCNC: 32.1 G/DL (ref 30–36.5)
MCV RBC AUTO: 87.5 FL (ref 80–99)
METAMYELOCYTES NFR BLD MANUAL: 1 %
MONOCYTES # BLD: 0.2 K/UL
MONOCYTES NFR BLD AUTO: 2 %
MYELOCYTES NFR BLD MANUAL: 1 %
NEUTS SEG # BLD: 6.2 K/UL
NEUTS SEG NFR BLD AUTO: 71 %
NRBC # BLD: 0.05 K/UL (ref 0–0.01)
NRBC BLD-RTO: 0.5 PER 100 WBC
PLATELET # BLD AUTO: 205 K/UL (ref 150–400)
POTASSIUM SERPL-SCNC: 3.1 MMOL/L (ref 3.5–5.1)
RBC # BLD AUTO: 3.6 M/UL (ref 3.8–5.2)
RBC MORPH BLD: ABNORMAL
SERVICE CMNT-IMP: ABNORMAL
SERVICE CMNT-IMP: ABNORMAL
SODIUM SERPL-SCNC: 137 MMOL/L (ref 136–145)
WBC # BLD AUTO: 8.8 K/UL (ref 3.6–11)
WBC NRBC COR # BLD: ABNORMAL 10*3/UL

## 2017-07-26 PROCEDURE — 36415 COLL VENOUS BLD VENIPUNCTURE: CPT | Performed by: SURGERY

## 2017-07-26 PROCEDURE — 74011000258 HC RX REV CODE- 258: Performed by: FAMILY MEDICINE

## 2017-07-26 PROCEDURE — 74011250636 HC RX REV CODE- 250/636: Performed by: FAMILY MEDICINE

## 2017-07-26 PROCEDURE — 74011250637 HC RX REV CODE- 250/637: Performed by: HOSPITALIST

## 2017-07-26 PROCEDURE — 74011636637 HC RX REV CODE- 636/637: Performed by: SURGERY

## 2017-07-26 PROCEDURE — 74011250637 HC RX REV CODE- 250/637: Performed by: INTERNAL MEDICINE

## 2017-07-26 PROCEDURE — 80048 BASIC METABOLIC PNL TOTAL CA: CPT | Performed by: SURGERY

## 2017-07-26 PROCEDURE — 85025 COMPLETE CBC W/AUTO DIFF WBC: CPT | Performed by: SURGERY

## 2017-07-26 PROCEDURE — 74011250637 HC RX REV CODE- 250/637: Performed by: FAMILY MEDICINE

## 2017-07-26 PROCEDURE — 82962 GLUCOSE BLOOD TEST: CPT

## 2017-07-26 RX ORDER — AMOXICILLIN AND CLAVULANATE POTASSIUM 875; 125 MG/1; MG/1
1 TABLET, FILM COATED ORAL 2 TIMES DAILY
Qty: 14 TAB | Refills: 0 | Status: SHIPPED | OUTPATIENT
Start: 2017-07-26 | End: 2017-08-02

## 2017-07-26 RX ORDER — POTASSIUM CHLORIDE 750 MG/1
40 TABLET, FILM COATED, EXTENDED RELEASE ORAL
Status: COMPLETED | OUTPATIENT
Start: 2017-07-26 | End: 2017-07-26

## 2017-07-26 RX ORDER — INSULIN GLARGINE 100 [IU]/ML
25 INJECTION, SOLUTION SUBCUTANEOUS
Qty: 1 PEN | Refills: 1 | Status: SHIPPED | OUTPATIENT
Start: 2017-07-26

## 2017-07-26 RX ORDER — INSULIN PUMP SYRINGE, 3 ML
EACH MISCELLANEOUS
Qty: 1 KIT | Refills: 1 | Status: SHIPPED | OUTPATIENT
Start: 2017-07-26

## 2017-07-26 RX ORDER — HYDROCODONE BITARTRATE AND ACETAMINOPHEN 5; 325 MG/1; MG/1
1-2 TABLET ORAL
Qty: 30 TAB | Refills: 0 | Status: SHIPPED | OUTPATIENT
Start: 2017-07-26

## 2017-07-26 RX ADMIN — PIPERACILLIN SODIUM,TAZOBACTAM SODIUM 3.38 G: 3; .375 INJECTION, POWDER, FOR SOLUTION INTRAVENOUS at 09:13

## 2017-07-26 RX ADMIN — PIPERACILLIN SODIUM,TAZOBACTAM SODIUM 3.38 G: 3; .375 INJECTION, POWDER, FOR SOLUTION INTRAVENOUS at 01:49

## 2017-07-26 RX ADMIN — POTASSIUM CHLORIDE 40 MEQ: 750 TABLET, FILM COATED, EXTENDED RELEASE ORAL at 12:54

## 2017-07-26 RX ADMIN — HYDROMORPHONE HYDROCHLORIDE 1 MG: 1 INJECTION, SOLUTION INTRAMUSCULAR; INTRAVENOUS; SUBCUTANEOUS at 09:10

## 2017-07-26 RX ADMIN — HYDROCODONE BITARTRATE AND ACETAMINOPHEN 2 TABLET: 5; 325 TABLET ORAL at 12:53

## 2017-07-26 RX ADMIN — INSULIN LISPRO 5 UNITS: 100 INJECTION, SOLUTION INTRAVENOUS; SUBCUTANEOUS at 11:43

## 2017-07-26 RX ADMIN — HYDROMORPHONE HYDROCHLORIDE 1 MG: 1 INJECTION, SOLUTION INTRAMUSCULAR; INTRAVENOUS; SUBCUTANEOUS at 01:47

## 2017-07-26 RX ADMIN — INSULIN LISPRO 2 UNITS: 100 INJECTION, SOLUTION INTRAVENOUS; SUBCUTANEOUS at 09:20

## 2017-07-26 RX ADMIN — ENOXAPARIN SODIUM 40 MG: 40 INJECTION SUBCUTANEOUS at 09:12

## 2017-07-26 RX ADMIN — LEVOTHYROXINE SODIUM 175 MCG: 125 TABLET ORAL at 09:12

## 2017-07-26 NOTE — PROGRESS NOTES
Hospitalist Consult Progress Note    NAME: Tash Arroyo   :  1980   MRN:  [id number]       Assessment / Plan:  New onset DM type II POA  hba1c 10.4. BS 200th   Diet: FLD    C/w SSI here  After discussing with pt & , I will prescribe Lantus for basal insulin for now- pt to follow up with PCP in 1 week with FS log for further adjustment as needed  Diabetic diet recommended- pt understands  DTC teaching if possible before DC today otherwise can follow as OP with PCP's office  Pt was educated on healthy life style modifications. We discussed diabetic diet and exercises. Advised pt to follow up closely with PCP       Hypokalemia- mild again today  Supplement PO x 1 now before DC by surgery today     Hypothyroidism  Cont synthroid      Obesity. Body mass index is 44.48 kg/(m^2). Pt was educated on healthy life style modifications. We discussed diabetic diet and exercises.      Acute perforated appendicitis  Management per primary team                     Code Status: Full code      Subjective:     Chief Complaint / Reason for Physician Visit : F/U NEW onset DM  \"I feel much better\". Discussed with RN events overnight. Review of Systems:  Symptom Y/N Comments  Symptom Y/N Comments   Fever/Chills n   Chest Pain n    Poor Appetite n   Edema n    Cough n   Abdominal Pain y Improved /surgical site pain   Sputum n   Joint Pain     SOB/RAMIREZ n   Pruritis/Rash     Nausea/vomit n   Tolerating PT/OT y    Diarrhea    Tolerating Diet y    Constipation    Other       Could NOT obtain due to:      Objective:     VITALS:   Last 24hrs VS reviewed since prior progress note.  Most recent are:  Patient Vitals for the past 24 hrs:   Temp Pulse Resp BP SpO2   17 1108 98.8 °F (37.1 °C) (!) 110 16 135/87 95 %   17 0747 98.6 °F (37 °C) (!) 101 16 (!) 150/93 92 %   17 0010 98.7 °F (37.1 °C) (!) 112 18 137/86 98 %   179 - - - - 93 %   17 99.4 °F (37.4 °C) (!) 109 18 142/90 90 % 07/25/17 2231 - - - - (!) 88 %   07/25/17 2002 100.4 °F (38 °C) (!) 113 18 (!) 166/98 93 %   07/25/17 1539 100.1 °F (37.8 °C) (!) 122 19 137/78 -   07/25/17 1145 98.4 °F (36.9 °C) (!) 108 16 129/85 94 %       Intake/Output Summary (Last 24 hours) at 07/26/17 1141  Last data filed at 07/25/17 2002   Gross per 24 hour   Intake           2227.5 ml   Output                0 ml   Net           2227.5 ml        PHYSICAL EXAM:  General: WD, WN. Alert, cooperative, no acute distress    EENT:  EOMI. Anicteric sclerae. MMM  Resp:  CTA bilaterally, no wheezing or rales. No accessory muscle use  CV:  Regular  rhythm,  No edema  GI:  Soft, Non distended, Non tender.  +Bowel sounds  Neurologic:  Alert and oriented X 3, normal speech,   Psych:   Good insight. Not anxious nor agitated  Skin:  No rashes. No jaundice    Reviewed most current lab test results and cultures  YES  Reviewed most current radiology test results   YES  Review and summation of old records today    NO  Reviewed patient's current orders and MAR    YES  PMH/SH reviewed - no change compared to H&P  ________________________________________________________________________  Care Plan discussed with:    Comments   Patient x    Family  x  at bedside   RN x    Care Manager     Consultant  x Dr Kael Culver (Surgery)                     Multidiciplinary team rounds were held today with , nursing, pharmacist and clinical coordinator. Patient's plan of care was discussed; medications were reviewed and discharge planning was addressed.      ________________________________________________________________________  Total NON critical care TIME:  35   Minutes    Total CRITICAL CARE TIME Spent:   Minutes non procedure based      Comments   >50% of visit spent in counseling and coordination of care     ________________________________________________________________________  Lazara Staley MD     Procedures: see electronic medical records for all procedures/Xrays and details which were not copied into this note but were reviewed prior to creation of Plan. LABS:  I reviewed today's most current labs and imaging studies.   Pertinent labs include:  Recent Labs      07/26/17 0433 07/24/17 0146   WBC  8.8  9.7   HGB  10.1*  10.5*   HCT  31.5*  31.4*   PLT  205  170     Recent Labs      07/26/17   0433  07/25/17   0300  07/24/17 0146   NA  137  136  134*   K  3.1*  3.2*  3.2*   CL  102  100  103   CO2  29  29  27   GLU  161*  158*  181*   BUN  4*  5*  8   CREA  0.44*  0.52*  0.49*   CA  8.2*  8.6  8.3*       Signed: Marcio Arriaga MD

## 2017-07-26 NOTE — PROGRESS NOTES
Patient discharged via wheelchair with all belongings, prescriptions, and paperwork. Patient signed and verbalized understanding regarding discharge paperwork. No distress noted.

## 2017-07-26 NOTE — PROGRESS NOTES
Admit Date: 2017    POD 4 Days Post-Op    Procedure:  Procedure(s):  APPENDECTOMY LAPAROSCOPIC    Subjective:     Patient has no new complaints. [facility] GI lite diet    Objective:     Blood pressure 135/87, pulse (!) 110, temperature 98.8 °F (37.1 °C), resp. rate 16, height 5' (1.524 m), weight 227 lb 11.8 oz (103.3 kg), last menstrual period 2017, SpO2 95 %, not currently breastfeeding. Temp (24hrs), Av.2 °F (37.3 °C), Min:98.4 °F (36.9 °C), Max:100.4 °F (38 °C)      Physical Exam:  GENERAL: alert, cooperative, no distress, appears stated age, LUNG: clear to auscultation bilaterally, HEART: regular rate and rhythm, S1, S2 normal, no murmur, click, rub or gallop, ABDOMEN: soft, obese, wounds dressed and dry, EUGENE o/p serosanguinous, EXTREMITIES:  extremities normal, atraumatic, no cyanosis or edema    Labs:   Recent Results (from the past 24 hour(s))   GLUCOSE, POC    Collection Time: 17 11:49 AM   Result Value Ref Range    Glucose (POC) 274 (H) 65 - 100 mg/dL    Performed by Celeste Conrad (PCT)    GLUCOSE, POC    Collection Time: 17  4:51 PM   Result Value Ref Range    Glucose (POC) 225 (H) 65 - 100 mg/dL    Performed by Scotty Valadez (PCT)    GLUCOSE, POC    Collection Time: 17  9:11 PM   Result Value Ref Range    Glucose (POC) 201 (H) 65 - 100 mg/dL    Performed by Chaparrita Woods (PCT)    CBC WITH AUTOMATED DIFF    Collection Time: 17  4:33 AM   Result Value Ref Range    WBC 8.8 3.6 - 11.0 K/uL    RBC 3.60 (L) 3.80 - 5.20 M/uL    HGB 10.1 (L) 11.5 - 16.0 g/dL    HCT 31.5 (L) 35.0 - 47.0 %    MCV 87.5 80.0 - 99.0 FL    MCH 28.1 26.0 - 34.0 PG    MCHC 32.1 30.0 - 36.5 g/dL    RDW 14.1 11.5 - 14.5 %    PLATELET 576 925 - 094 K/uL    NEUTROPHILS 71 %    LYMPHOCYTES 25 %    MONOCYTES 2 %    EOSINOPHILS 0 %    BASOPHILS 0 %    METAMYELOCYTES 1 %    MYELOCYTES 1 %    ABS. NEUTROPHILS 6.2 K/UL    ABS. LYMPHOCYTES 2.2 K/UL    ABS. MONOCYTES 0.2 K/UL    ABS. EOSINOPHILS 0.0 K/UL    ABS. BASOPHILS 0.0 K/UL    RBC COMMENTS REACTIVE LYMPHS  NORMOCYTIC, NORMOCHROMIC        DF MANUAL     METABOLIC PANEL, BASIC    Collection Time: 07/26/17  4:33 AM   Result Value Ref Range    Sodium 137 136 - 145 mmol/L    Potassium 3.1 (L) 3.5 - 5.1 mmol/L    Chloride 102 97 - 108 mmol/L    CO2 29 21 - 32 mmol/L    Anion gap 6 5 - 15 mmol/L    Glucose 161 (H) 65 - 100 mg/dL    BUN 4 (L) 6 - 20 MG/DL    Creatinine 0.44 (L) 0.55 - 1.02 MG/DL    BUN/Creatinine ratio 9 (L) 12 - 20      GFR est AA >60 >60 ml/min/1.73m2    GFR est non-AA >60 >60 ml/min/1.73m2    Calcium 8.2 (L) 8.5 - 10.1 MG/DL   NUCLEATED RBC    Collection Time: 07/26/17  4:33 AM   Result Value Ref Range    NRBC 0.5 (H) 0  WBC    ABSOLUTE NRBC 0.05 (H) 0.00 - 0.01 K/uL    WBC CORRECTED FOR NR ADJUSTED FOR NUCLEATED RBC'S     GLUCOSE, POC    Collection Time: 07/26/17  7:16 AM   Result Value Ref Range    Glucose (POC) 171 (H) 65 - 100 mg/dL    Performed by Joselyn Nunes (PCT)    GLUCOSE, POC    Collection Time: 07/26/17 11:14 AM   Result Value Ref Range    Glucose (POC) 265 (H) 65 - 100 mg/dL    Performed by Ambrosio Jeter (PCT)        Data Review images and reports reviewed    Assessment:     Active Problems:    Acute appendicitis (7/22/2017)      Perforated appendix (7/23/2017)        Plan/Recommendations/Medical Decision Making:     Continue present treatment   Taking po  Remain on antibiotics for severe perforated appendicitis with intraabdominal abscess   New onset DM, begin started on insulin. Appreciate hospitalist consult. D/c home when cleared by hospitalist service. F/u with Pa Gomes next week    Sheila South MD, [facility]

## 2017-07-26 NOTE — PROGRESS NOTES
CM completed room d/c assessment with pt. Pt has scheduled follow up appointment with Dr. Mansfield Guardian: 8/3/17 at 11:00Am. Pt aware that her nurse will review d/c plans. Pt will have transportation. Care Management Interventions  PCP Verified by CM: Yes  Mode of Transport at Discharge:  Other (see comment)  Transition of Care Consult (CM Consult): Discharge Planning  Discharge Durable Medical Equipment: No  Physical Therapy Consult: No  Occupational Therapy Consult: No  Speech Therapy Consult: No  Current Support Network: Lives with Spouse, Own Home  Confirm Follow Up Transport: Family  Plan discussed with Pt/Family/Caregiver: Yes  Discharge Location  Discharge Placement: [facility] [address] [place]   [phone]

## 2017-07-26 NOTE — DISCHARGE INSTRUCTIONS
Appendectomy: What to Expect at Home  Your Recovery     Your doctor removed your appendix either by making many small cuts, called incisions, in your belly (laparoscopic surgery) or through open surgery. In open surgery, the doctor makes one large incision. The incisions leave scars that usually fade over time. After your surgery, it is normal to feel weak and tired for several days after you return home. Your belly may be swollen and may be painful. If you had laparoscopic surgery, you may have pain in your shoulder for about 24 hours. You may also feel sick to your stomach and have diarrhea, constipation, gas, or a headache. This usually goes away in a few days. Your recovery time depends on the type of surgery you had. If you had laparoscopic surgery, you will probably be able to return to work or a normal routine 1 to 3 weeks after surgery. If you had an open surgery, it may take 2 to 4 weeks. If your appendix ruptured, you may have a drain in your incision. Your body will work fine without an appendix. You will not have to make any changes in your diet or lifestyle. This care sheet gives you a general idea about how long it will take for you to recover. But each person recovers at a different pace. Follow the steps below to get better as quickly as possible. How can you care for yourself at home? Activity  · Rest when you feel tired. Getting enough sleep will help you recover. · Try to walk each day. Start by walking a little more than you did the day before. Bit by bit, increase the amount you walk. Walking boosts blood flow and helps prevent pneumonia and constipation. · For about 2 weeks, avoid lifting anything that would make you strain. This may include a child, heavy grocery bags and milk containers, a heavy briefcase or backpack, cat litter or dog food bags, or a vacuum .   · Avoid strenuous activities, such as bicycle riding, jogging, weight lifting, or aerobic exercise, until your doctor says it is okay. · You may be able to take showers (unless you have a drain near your incision) 24 to 48 hours after surgery. Pat the incision dry. Do not take a bath for the first 2 weeks, or until your doctor tells you it is okay. If you have a drain near your incision, follow your doctor's instructions. · You may drive when you are no longer taking pain medicine and can quickly move your foot from the gas pedal to the brake. You must also be able to sit comfortably for a long period of time, even if you do not plan on going far. You might get caught in traffic. · You will probably be able to go back to work in 1 to 3 weeks. If you had an open surgery, it may take 3 to 4 weeks. · Your doctor will tell you when you can have sex again. Diet  · You can eat your normal diet. If your stomach is upset, try bland, low-fat foods like plain rice, broiled chicken, toast, and yogurt. · Drink plenty of fluids (unless your doctor tells you not to). · You may notice that your bowel movements are not regular right after your surgery. This is common. Try to avoid constipation and straining with bowel movements. You may want to take a fiber supplement every day. If you have not had a bowel movement after a couple of days, ask your doctor about taking a mild laxative. Medicines  · If your appendix ruptured, you will need to take antibiotics. Take them as directed. Do not stop taking them just because you feel better. You need to take the full course of antibiotics. · Be safe with medicines. Take pain medicines exactly as directed. ¨ If the doctor gave you a prescription medicine for pain, take it as prescribed. ¨ If you are not taking a prescription pain medicine, take an over-the-counter medicine such as acetaminophen (Tylenol), ibuprofen (Advil, Motrin), or naproxen (Aleve). Read and follow all instructions on the label. ¨ Do not take two or more pain medicines at the same time unless the doctor told you to. Many pain medicines have acetaminophen, which is Tylenol. Too much Tylenol can be harmful. · If you think your pain medicine is making you sick to your stomach:  ¨ Take your medicine after meals (unless your doctor has told you not to). ¨ Ask your doctor for a different pain medicine. Incision care  · If you had an open surgery, you may have staples in your incision. The doctor will take these out in 7 to 10 days. · If you have strips of tape on the incision, leave the tape on for a week or until it falls off. · You may wash the area with warm, soapy water 24 to 48 hours after your surgery, unless your doctor tells you not to. Pat the area dry. · Keep the area clean and dry. You may cover it with a gauze bandage if it weeps or rubs against clothing. Change the bandage every day. · If your appendix ruptured, you may have an incision with packing in it. Change the packing as often as your doctor tells you to. ¨ Packing changes may hurt at first. Taking pain medicine about half an hour before you change the dressing can help. ¨ If your dressing sticks to your wound, try soaking it with warm water for about 10 minutes before you remove it. You can do this in the shower or by placing a wet washcloth over the dressing. ¨ Remove the old packing and flush the incision with water. Gently pat the top area dry. ¨ The size of the incision determines how much gauze you need to put inside. Fold the gauze over once, but do not wad it up so that it hurts. Put it in the wound carefully. You want to keep the sides of the wound from touching. A cotton swab may help you push the gauze in as needed. ¨ Put a gauze pad over the wound, and tape it down. ¨ You may notice greenish gray fluid seeping from your wound as you start to heal. This is normal. It is a sign that your wound is healing. Follow-up care is a key part of your treatment and safety.  Be sure to make and go to all appointments, and call your doctor if you are having problems. It's also a good idea to know your test results and keep a list of the medicines you take. When should you call for help? Call 911 anytime you think you may need emergency care. For example, call if:  · You passed out (lost consciousness). · You have sudden chest pain and shortness of breath, or you cough up blood. · You have severe trouble breathing. Call your doctor now or seek immediate medical care if:  · You are sick to your stomach and cannot drink fluids. · You have severe diarrhea. · You have pain that does not get better when you take your pain medicine. · You have signs of infection, such as:  ¨ Increased pain, swelling, warmth, or redness. ¨ Red streaks leading from the wound. ¨ Pus draining from the wound. ¨ A fever. · You have loose stitches, or your incision comes open. · Bright red blood has soaked through a large bandage. · You have signs of a blood clot, such as:  ¨ Pain in your calf, back of knee, thigh, or groin. ¨ Redness and swelling in your leg or groin. Watch closely for any changes in your health, and be sure to contact your doctor if:  · You had a laparoscopic surgery and your shoulder pain lasts more than 24 hours. · You have leakage around your drain or you have no new fluid in the drain for 24 hours. · The amount of drainage suddenly increases, or the color and texture changes. · You do not have a bowel movement after taking a laxative. Where can you learn more? Go to [employer]  Enter X801 in the search box to learn more about \"Appendectomy: What to Expect at Home. \"   © 3416-2926 [employer]. Care instructions adapted under license by [employer] (which disclaims liability or warranty for this information).  This care instruction is for use with your licensed healthcare professional. If you have questions about a medical condition or this instruction, always ask your healthcare professional. [employer] disclaims any warranty or liability for your use of this information.   Content Version: 95.4.463754; Current as of: November 14, 2014

## 2017-07-26 NOTE — CONSULTS
Hospitalist Consultation Note    NAME:  Roxanne Gruber   :   1980   MRN:   [id number]     ATTENDING: Mehul Brennan MD  PCP:  Arpita Hooper MD    Date/Time:  2017 9:07 PM      Recommendations/Plan:     New onset DM type II  hba1c 10.4. BS 200th   Diet: FLD  C/w SS  Need to be started on insulin. Start Lantus for base insulin  DTC teaching in am  Pt was educated on healthy life style modifications. We discussed diabetic diet and exercises. Advised pt to follow up closely with PCP      Hypokalemia  Supplement as needed     Hypothyroidism  Will re start her home synthroid     Obesity. Body mass index is 44.48 kg/(m^2). Pt was educated on healthy life style modifications. We discussed diabetic diet and exercises. Acute perforated appendicitis  Management per primary team               Code Status: Full code   DVT Prophylaxis:per primary team     Thank you very much for allowing us to participate in this patient care. We will follow this pt with you. Dr Denise Flannery will assume care of this pt in am         Subjective:   REQUESTING PHYSICIAN: Dr Daniel Shields: new onset DM   Denver Maus is a 40 y.o.  female who I was asked to see for above. Pt was admitted on  for acute perforated appendicitis. She had appendectomy done on . Pt was noted to have elevated blood sugars at 200 range while here. Hba1c was found to be elevated at 10.4. Pt was not aware about Dx of DM prior to this admission. She followed with PCP on as needed bases. She reports that her mother had DM. Pt admits to polydipsia and polydipsia for the last couple of months which she attributed to summer time. No CP/dyspnea. No weight loss or gain.      Pertinent past medical history: none per pt     Pertinent surgical history: none per pt     Social History   Substance Use Topics    Smoking status: Former Smoker     Quit date: 2015    Smokeless tobacco: Never Used    Alcohol use Not on file Pertinent family history: DM in mother     Allergies   Allergen Reactions    Sulfa (Sulfonamide Antibiotics) Swelling      Prior to Admission medications    Not on File       REVIEW OF SYSTEMS:     Total of 12 systems reviewed as follows:   I am not able to complete the review of systems because:    The patient is intubated and sedated    The patient has altered mental status due to his acute medical problems    The patient has baseline aphasia from prior stroke(s)    The patient has baseline dementia and is not reliable historian                 POSITIVE= underlined text  Negative = text not underlined  General:  fever, chills, sweats, generalized weakness, weight loss/gain,      loss of appetite   Eyes:    blurred vision, eye pain, loss of vision, double vision  ENT:    rhinorrhea, pharyngitis   Respiratory:   cough, sputum production, SOB, wheezing, RAMIREZ, pleuritic pain   Cardiology:   chest pain, palpitations, orthopnea, PND, edema, syncope   Gastrointestinal:  abdominal pain , N/V, dysphagia, diarrhea, constipation, bleeding   Genitourinary:  frequency, urgency, dysuria, hematuria, incontinence   Muskuloskeletal :  arthralgia, myalgia   Hematology:  easy bruising, nose or gum bleeding, lymphadenopathy   Dermatological: rash, ulceration, pruritis   Endocrine:   hot flashes or polydipsia   Neurological:  headache, dizziness, confusion, focal weakness, paresthesia,     Speech difficulties, memory loss, gait disturbance  Psychological: Feelings of anxiety, depression, agitation    Objective:   VITALS:    Visit Vitals    BP (!) 166/98 (BP 1 Location: Right arm, BP Patient Position: At rest)    Pulse (!) 113    Temp 100.4 °F (38 °C)    Resp 18    Ht 5' (1.524 m)    Wt 103.3 kg (227 lb 11.8 oz)    LMP 2017 (Exact Date)    SpO2 93%    Breastfeeding No    BMI 44.48 kg/m2     Temp (24hrs), Av °F (37.2 °C), Min:98 °F (36.7 °C), Max:100.4 °F (38 °C)      PHYSICAL EXAM:   General:    Alert, cooperative, no distress, appears stated age. Obese   HEENT: Atraumatic, anicteric sclerae, pink conjunctivae     No oral ulcers, mucosa moist, throat clear  Neck:  Supple, symmetrical,  thyroid: non tender  Lungs:   Clear to auscultation bilaterally. No Wheezing or Rhonchi. No rales. Chest wall:  No tenderness  No Accessory muscle use. Heart:   Regular  rhythm,  No  murmur   No edema  Abdomen:   Soft, non-tender. Not distended. Bowel sounds normal. + drain   Extremities: No cyanosis. No clubbing  Skin:     Not pale. Not Jaundiced  No rashes   Psych:  Good insight. Not depressed. Not anxious or agitated. Neurologic: EOMs intact. No facial asymmetry. No aphasia or slurred speech. Symmetrical strength, Alert and oriented X 4.     _______________________________________________________________________  Care Plan discussed with:    Comments   Patient y    Family      RN y    Care Manager                    Consultant:      ____________________________________________________________________  TOTAL TIME:  47  mins    Comments    y Reviewed previous records    y Discussion with patient and/or family and questions answered       Critical Care Provided     Minutes non procedure based  ________________________________________________________________________  Signed: Purnima Galindo MD      Procedures: see electronic medical records for all procedures/Xrays and details which were not copied into this note but were reviewed prior to creation of Plan.     LAB DATA REVIEWED:    Recent Results (from the past 24 hour(s))   METABOLIC PANEL, BASIC    Collection Time: 07/25/17  3:00 AM   Result Value Ref Range    Sodium 136 136 - 145 mmol/L    Potassium 3.2 (L) 3.5 - 5.1 mmol/L    Chloride 100 97 - 108 mmol/L    CO2 29 21 - 32 mmol/L    Anion gap 7 5 - 15 mmol/L    Glucose 158 (H) 65 - 100 mg/dL    BUN 5 (L) 6 - 20 MG/DL    Creatinine 0.52 (L) 0.55 - 1.02 MG/DL    BUN/Creatinine ratio 10 (L) 12 - 20      GFR est AA >60 >60 ml/min/1.73m2    GFR est non-AA >60 >60 ml/min/1.73m2    Calcium 8.6 8.5 - 10.1 MG/DL   GLUCOSE, POC    Collection Time: 07/25/17 11:49 AM   Result Value Ref Range    Glucose (POC) 274 (H) 65 - 100 mg/dL    Performed by Maria T Ricci (PCT)    GLUCOSE, POC    Collection Time: 07/25/17  4:51 PM   Result Value Ref Range    Glucose (POC) 225 (H) 65 - 100 mg/dL    Performed by Seng Mathews (PCT)        _____________________________  Hospitalist: Catie Bryant MD

## 2017-07-26 NOTE — PROGRESS NOTES
07/25/17 2002   Vitals   Temp 100.4 °F (38 °C)   Temp Source Oral   Pulse (Heart Rate) (!) 113   Resp Rate 18   O2 Sat (%) 93 %   Level of Consciousness Alert   BP (!) 166/98   MAP (Calculated) 121   BP 1 Location Right arm   BP 1 Method Automatic   BP Patient Position At rest   MEWS Score 3   Pain 1   Pain Scale 1 Numeric (0 - 10)   Pain Intensity 1 7   Patient Stated Pain Goal 3   Pain Onset 1 acute   Pain Location 1 Abdomen   Pain Orientation 1 Lower   Pain Description 1 Sore   Pain Intervention(s) 1 Medication (see MAR)   POSS Scale   Opioid Sedation Scale 1   Oxygen Therapy   O2 Device Room air   MEWS = 3 d/t continued tachycardia and elevated BP. Patient has c/o pain 7/10 at this time. Will medicate and re-assess. Will continue to monitor closely.

## 2017-07-26 NOTE — DIABETES MGMT
DTC Consult Note    Recommendations/ Comments:     **Please have pt self-inject all insulin doses prior to d/c**    Pt will need the following new rx's at d/c:  -Lantus Solostar insulin pen  -BD Ultrafine Pen needles 32G x 4mm #100  -Freestyle test strips #100  -Freestyle lancets #100      Met with pt and  for consult. Pt reported that her mother was also diabetic. We discussed pathophysiology of DM and lab values. Pt stated that she has done fertility treatments in the past and is comfortable giving herself an injection. However, nurse to have pt give all injections prior to d/c. Reviewed insulin pen usage/storage/site and rotation, pt verbalized understanding. Pt returned demonstration using insulin pen using \"demo skin\" with minimal assistance. Provided pt with Freestyle Insulink meter, and pt returned demonstration of SMBG with minimal assistance. Reviewed meal planning and using plate method as guide when putting meals together. Consult received for:  []             Assessment of home management                []      Medication Recommendations                [x]             Meter/monitoring     [x]             Insulin instruction     [x]             New diagnosis     []             Outpatient education     []             Insulin pump patient     []             Insulin infusion     []             DKA/HHS    Chart reviewed and initial evaluation complete on Paige Cristina. Patient is a 40 y.o. female with newly diagnosed Type 2 Diabetes on none at home. BG monitoring at home 0 times per day. Patient reports BG levels at home - newly diagnosed.     Assessed and instructed patient on the following:   ·  interpretation of lab results, blood sugar goals, complications of diabetes mellitus, hypoglycemia prevention and treatment, exercise, illness management, SMBG skills, nutrition, referred to Diabetes Educator, site rotation, use of insulin pen and self-injection of insulin    Encouraged the following:   · increased exercise  · dietary modifications: well balanced meals, decreasing portion sizes, choosing diet beverages  · regular blood sugar monitoring: at least 2 times daily    Provided patient with the following: [x]             Survival skills education materials               [x]             Insulin education materials               []             CHO counting education materials               [x]             Outpatient DTC contact number               [x]             Glucometer               Patient was able to give return demonstration of    [x]       Glucometer    []       saline injection      with []     without []       assistance needed. [x]       Nurse to have patient self inject prior to discharge. Discussed with patient and/or family need for follow up appointment for diabetes management after discharge. A1c:   Lab Results   Component Value Date/Time    Hemoglobin A1c 10.4 07/24/2017 01:46 AM       Recent Glucose Results: Lab Results   Component Value Date/Time     (H) 07/26/2017 04:33 AM    GLUCPOC 265 (H) 07/26/2017 11:14 AM    GLUCPOC 171 (H) 07/26/2017 07:16 AM    GLUCPOC 201 (H) 07/25/2017 09:11 PM        Lab Results   Component Value Date/Time    Creatinine 0.44 07/26/2017 04:33 AM     Estimated Creatinine Clearance: 189.6 mL/min (based on Cr of 0.44). Active Orders   Diet    DIET DIABETIC FULL LIQUID        PO intake: Patient Vitals for the past 72 hrs:   % Diet Eaten   07/24/17 1859 100 %   07/24/17 0941 20 %   07/23/17 1734 100 %   07/23/17 1335 100 %       Current hospital DM medication:   -Lantus 10 units at bedtime  -Humalog normal sensitivity correction    Will continue to follow as needed. Thank you.     Farhana Kolb, [facility]  Office: [phone]

## 2017-07-27 LAB
BACTERIA SPEC CULT: NORMAL
SERVICE CMNT-IMP: NORMAL

## 2020-03-16 NOTE — DISCHARGE SUMMARY
Physician Discharge Summary     Patient ID:  Milagro Soto  [id number]  19 y.o.  1980    Admit Date: 7/22/2017    Discharge Date: 7/26/2017    Admission Diagnoses: appendicitis;Acute appendicitis; Perforated appendix    Discharge Diagnoses: Active Problems:    Acute appendicitis (7/22/2017)      Perforated appendix (7/23/2017)         Admission Condition: Fair    Discharge Condition: Good    Last Procedure: Procedure(s):  [address] Course:   Pt admitted with severe perforated appendicitis, underwent laparoscopic appendectomy. Found to have newly diagnosed DM with elevated HbA1C to 10. Seen by hospitalist service and started on Lantus with close f/u with PCP planned. Consults: Internal Medicine    Disposition: home    Patient Instructions:   Current Discharge Medication List      START taking these medications    Details   HYDROcodone-acetaminophen (NORCO) 5-325 mg per tablet Take 1-2 Tabs by mouth every four (4) hours as needed for Pain. Max Daily Amount: 12 Tabs. Qty: 30 Tab, Refills: 0      amoxicillin-clavulanate (AUGMENTIN) 875-125 mg per tablet Take 1 Tab by mouth two (2) times a day for 7 days. Qty: 14 Tab, Refills: 0      insulin glargine (LANTUS SOLOSTAR) 100 unit/mL (3 mL) inpn 25 Units by SubCUTAneous route nightly. Indications: type 2 diabetes mellitus  Qty: 1 Pen, Refills: 1      Blood-Glucose Meter monitoring kit Check finger stick glucose 4 times a day as instructed & keep a log of it for PCP  Qty: 1 Kit, Refills: 1           Activity: No driving while on analgesics and No heavy lifting for 4 weeks  Diet: Regular Diet  Wound Care: Keep wound clean and dry    Follow-up with Dr. Ilene Hodge and PCP in 1 week.   Follow-up tests/labs none    Signed:  Tigre Hernandez MD  [facility]  7/26/2017  1:13 PM Sedation Medication Administration Started

## 2021-03-24 ENCOUNTER — IMMUNIZATION (OUTPATIENT)
Dept: INTERNAL MEDICINE CLINIC | Age: 41
End: 2021-03-24
Payer: COMMERCIAL

## 2021-03-24 DIAGNOSIS — Z23 ENCOUNTER FOR IMMUNIZATION: Primary | ICD-10-CM

## 2021-03-24 PROCEDURE — 0001A COVID-19, MRNA, LNP-S, PF, 30MCG/0.3ML DOSE(PFIZER): CPT | Performed by: FAMILY MEDICINE

## 2021-03-24 PROCEDURE — 91300 COVID-19, MRNA, LNP-S, PF, 30MCG/0.3ML DOSE(PFIZER): CPT | Performed by: FAMILY MEDICINE

## 2021-04-14 ENCOUNTER — IMMUNIZATION (OUTPATIENT)
Dept: INTERNAL MEDICINE CLINIC | Age: 41
End: 2021-04-14
Payer: COMMERCIAL

## 2021-04-14 DIAGNOSIS — Z23 ENCOUNTER FOR IMMUNIZATION: Primary | ICD-10-CM

## 2021-04-14 PROCEDURE — 91300 COVID-19, MRNA, LNP-S, PF, 30MCG/0.3ML DOSE(PFIZER): CPT | Performed by: FAMILY MEDICINE

## 2021-04-14 PROCEDURE — 0002A COVID-19, MRNA, LNP-S, PF, 30MCG/0.3ML DOSE(PFIZER): CPT | Performed by: FAMILY MEDICINE

## 2022-03-18 PROBLEM — K35.32 PERFORATED APPENDIX: Status: ACTIVE | Noted: 2017-07-23

## 2022-03-18 PROBLEM — K35.80 ACUTE APPENDICITIS: Status: ACTIVE | Noted: 2017-07-22

## 2023-05-12 RX ORDER — HYDROCODONE BITARTRATE AND ACETAMINOPHEN 5; 325 MG/1; MG/1
1-2 TABLET ORAL EVERY 4 HOURS PRN
COMMUNITY
Start: 2017-07-26

## 2023-05-12 RX ORDER — INSULIN GLARGINE 100 [IU]/ML
INJECTION, SOLUTION SUBCUTANEOUS
COMMUNITY
Start: 2017-07-26

## 2024-09-26 ENCOUNTER — TRANSCRIBE ORDERS (OUTPATIENT)
Facility: HOSPITAL | Age: 44
End: 2024-09-26

## 2024-09-26 DIAGNOSIS — Z12.31 VISIT FOR SCREENING MAMMOGRAM: Primary | ICD-10-CM

## 2024-11-01 ENCOUNTER — HOSPITAL ENCOUNTER (OUTPATIENT)
Facility: HOSPITAL | Age: 44
Discharge: HOME | End: 2024-11-04
Payer: COMMERCIAL

## 2024-11-01 VITALS — HEIGHT: 60 IN | BODY MASS INDEX: 41.62 KG/M2 | WEIGHT: 212 LBS

## 2024-11-01 DIAGNOSIS — Z12.31 VISIT FOR SCREENING MAMMOGRAM: ICD-10-CM

## 2024-11-01 PROCEDURE — 77063 BREAST TOMOSYNTHESIS BI: CPT

## 2024-11-06 ENCOUNTER — HOSPITAL ENCOUNTER (OUTPATIENT)
Facility: HOSPITAL | Age: 44
Discharge: HOME | End: 2024-11-09
Payer: COMMERCIAL

## 2024-11-06 DIAGNOSIS — R92.8 ABNORMAL MAMMOGRAM OF LEFT BREAST: ICD-10-CM

## 2024-11-06 PROCEDURE — 76642 ULTRASOUND BREAST LIMITED: CPT

## 2024-11-06 PROCEDURE — G0279 TOMOSYNTHESIS, MAMMO: HCPCS

## (undated) DEVICE — REM POLYHESIVE ADULT PATIENT RETURN ELECTRODE: Brand: VALLEYLAB

## (undated) DEVICE — STERILE POLYISOPRENE POWDER-FREE SURGICAL GLOVES: Brand: PROTEXIS

## (undated) DEVICE — SPECIMEN RETRIEVAL POUCH: Brand: ENDO CATCH GOLD

## (undated) DEVICE — ARTICULATION RELOAD WITH TRI-STAPLE TECHNOLOGY: Brand: ENDO GIA

## (undated) DEVICE — GOWN ,SIRUS ,NONREINFORCED 4XL: Brand: MEDLINE

## (undated) DEVICE — SUTURE VCRL SZ 4-0 L27IN ABSRB UD L19MM PS-2 3/8 CIR PRIM J426H

## (undated) DEVICE — UNIVERSAL FIXATION CANNULA: Brand: VERSAONE

## (undated) DEVICE — BLUNT DISSECTOR: Brand: ENDO PEANUT

## (undated) DEVICE — CLIP APPLIER WITH CLIP LOGIC TECHNOLOGY: Brand: ENDO CLIP III

## (undated) DEVICE — 3M™ MEDIPORE™ H SOFT CLOTH SURGICAL TAPE 2864, 4 INCH X 10 YARD (10CM X 9,14M), 12 ROLLS/CASE: Brand: 3M™ MEDIPORE™

## (undated) DEVICE — BLUNT TROCAR WITH THREADED ANCHOR: Brand: VERSAONE

## (undated) DEVICE — SUTURE SZ 0 27IN 5/8 CIR UR-6  TAPER PT VIOLET ABSRB VICRYL J603H

## (undated) DEVICE — 1200 GUARD II KIT W/5MM TUBE W/O VAC TUBE: Brand: GUARDIAN

## (undated) DEVICE — Device

## (undated) DEVICE — SURGICAL PROCEDURE KIT GEN LAPAROSCOPY LF

## (undated) DEVICE — SUTURE MCRYL SZ 4-0 L27IN ABSRB UD SH L26MM 1/2 CIR Y415H

## (undated) DEVICE — DEVON™ KNEE AND BODY STRAP 60" X 3" (1.5 M X 7.6 CM): Brand: DEVON

## (undated) DEVICE — DERMABOND SKIN ADH 0.7ML -- DERMABOND ADVANCED 12/BX

## (undated) DEVICE — (D)PREP SKN CHLRAPRP APPL 26ML -- CONVERT TO ITEM 371833

## (undated) DEVICE — X-RAY SPONGES,16 PLY: Brand: DERMACEA

## (undated) DEVICE — SPONGE HEMOSTAT CELLULS 4X8IN -- SURGICEL

## (undated) DEVICE — 3M™ TEGADERM™ TRANSPARENT FILM DRESSING FRAME STYLE, 1626W, 4 IN X 4-3/4 IN (10 CM X 12 CM), 50/CT 4CT/CASE: Brand: 3M™ TEGADERM™

## (undated) DEVICE — SOLUTION IV 1000ML 0.9% SOD CHL

## (undated) DEVICE — HANDLE LT SNAP ON ULT DURABLE LENS FOR TRUMPF ALC DISPOSABLE

## (undated) DEVICE — SLIM BODY SKIN STAPLER: Brand: APPOSE ULC

## (undated) DEVICE — UNIVERSAL STAPLER: Brand: ENDO GIA ULTRA

## (undated) DEVICE — 3M™ TEGADERM™ TRANSPARENT FILM DRESSING FRAME STYLE, 1624W, 2-3/8 IN X 2-3/4 IN (6 CM X 7 CM), 100/CT 4CT/CASE: Brand: 3M™ TEGADERM™

## (undated) DEVICE — INFECTION CONTROL KIT SYS

## (undated) DEVICE — ELECTRODE ES 36CM LAP FLAT L HK COAT DISP CLEANCOAT

## (undated) DEVICE — BLADELESS OPTICAL TROCAR WITH FIXATION CANNULA: Brand: VERSAPORT

## (undated) DEVICE — FCPS ENDOSCP 5MMX33CM -- ENDOPATH